# Patient Record
Sex: FEMALE | Race: BLACK OR AFRICAN AMERICAN | HISPANIC OR LATINO | Employment: PART TIME | URBAN - METROPOLITAN AREA
[De-identification: names, ages, dates, MRNs, and addresses within clinical notes are randomized per-mention and may not be internally consistent; named-entity substitution may affect disease eponyms.]

---

## 2017-03-05 ENCOUNTER — HOSPITAL ENCOUNTER (OUTPATIENT)
Facility: MEDICAL CENTER | Age: 37
End: 2017-03-07
Attending: EMERGENCY MEDICINE | Admitting: HOSPITALIST
Payer: COMMERCIAL

## 2017-03-05 ENCOUNTER — APPOINTMENT (OUTPATIENT)
Dept: RADIOLOGY | Facility: MEDICAL CENTER | Age: 37
End: 2017-03-05
Attending: EMERGENCY MEDICINE
Payer: COMMERCIAL

## 2017-03-05 ENCOUNTER — OFFICE VISIT (OUTPATIENT)
Dept: URGENT CARE | Facility: CLINIC | Age: 37
End: 2017-03-05
Payer: COMMERCIAL

## 2017-03-05 ENCOUNTER — RESOLUTE PROFESSIONAL BILLING HOSPITAL PROF FEE (OUTPATIENT)
Dept: HOSPITALIST | Facility: MEDICAL CENTER | Age: 37
End: 2017-03-05
Payer: COMMERCIAL

## 2017-03-05 VITALS
WEIGHT: 260 LBS | OXYGEN SATURATION: 97 % | SYSTOLIC BLOOD PRESSURE: 124 MMHG | HEART RATE: 80 BPM | HEIGHT: 65 IN | DIASTOLIC BLOOD PRESSURE: 76 MMHG | BODY MASS INDEX: 43.32 KG/M2 | TEMPERATURE: 97.8 F | RESPIRATION RATE: 14 BRPM

## 2017-03-05 DIAGNOSIS — R10.13 EPIGASTRIC ABDOMINAL PAIN: Primary | ICD-10-CM

## 2017-03-05 DIAGNOSIS — R79.89 ELEVATED LFTS: ICD-10-CM

## 2017-03-05 DIAGNOSIS — R11.2 NAUSEA AND VOMITING, INTRACTABILITY OF VOMITING NOT SPECIFIED, UNSPECIFIED VOMITING TYPE: ICD-10-CM

## 2017-03-05 DIAGNOSIS — R10.13 EPIGASTRIC PAIN: ICD-10-CM

## 2017-03-05 DIAGNOSIS — K80.43 CHOLEDOCHOLITHIASIS WITH ACUTE CHOLECYSTITIS WITH OBSTRUCTION: ICD-10-CM

## 2017-03-05 PROBLEM — R10.9 ABDOMINAL PAIN: Status: ACTIVE | Noted: 2017-03-05

## 2017-03-05 LAB
ALBUMIN SERPL BCP-MCNC: 4.3 G/DL (ref 3.2–4.9)
ALBUMIN/GLOB SERPL: 1 G/DL
ALP SERPL-CCNC: 323 U/L (ref 30–99)
ALT SERPL-CCNC: 528 U/L (ref 2–50)
ANION GAP SERPL CALC-SCNC: 10 MMOL/L (ref 0–11.9)
APAP SERPL-MCNC: <10 UG/ML (ref 10–30)
APPEARANCE UR: CLEAR
APPEARANCE UR: CLEAR
APPEARANCE UR: NORMAL
AST SERPL-CCNC: 254 U/L (ref 12–45)
BASOPHILS # BLD AUTO: 1.8 % (ref 0–1.8)
BASOPHILS # BLD: 0.1 K/UL (ref 0–0.12)
BILIRUB SERPL-MCNC: 4.4 MG/DL (ref 0.1–1.5)
BILIRUB UR STRIP-MCNC: NORMAL MG/DL
BUN SERPL-MCNC: 6 MG/DL (ref 8–22)
CALCIUM SERPL-MCNC: 9.2 MG/DL (ref 8.4–10.2)
CHLORIDE SERPL-SCNC: 99 MMOL/L (ref 96–112)
CO2 SERPL-SCNC: 25 MMOL/L (ref 20–33)
COLOR UR AUTO: NORMAL
COLOR UR: ABNORMAL
COLOR UR: YELLOW
CREAT SERPL-MCNC: 0.75 MG/DL (ref 0.5–1.4)
EOSINOPHIL # BLD AUTO: 0.07 K/UL (ref 0–0.51)
EOSINOPHIL NFR BLD: 1.3 % (ref 0–6.9)
ERYTHROCYTE [DISTWIDTH] IN BLOOD BY AUTOMATED COUNT: 39.7 FL (ref 35.9–50)
GFR SERPL CREATININE-BSD FRML MDRD: >60 ML/MIN/1.73 M 2
GLOBULIN SER CALC-MCNC: 4.2 G/DL (ref 1.9–3.5)
GLUCOSE SERPL-MCNC: 100 MG/DL (ref 65–99)
GLUCOSE UR STRIP.AUTO-MCNC: NEGATIVE MG/DL
GLUCOSE UR STRIP.AUTO-MCNC: NEGATIVE MG/DL
GLUCOSE UR STRIP.AUTO-MCNC: NORMAL MG/DL
HCG SERPL QL: NEGATIVE
HCT VFR BLD AUTO: 39.4 % (ref 37–47)
HGB BLD-MCNC: 12.8 G/DL (ref 12–16)
IMM GRANULOCYTES # BLD AUTO: 0.01 K/UL (ref 0–0.11)
IMM GRANULOCYTES NFR BLD AUTO: 0.2 % (ref 0–0.9)
INR PPP: 1.01 (ref 0.87–1.13)
KETONES UR STRIP.AUTO-MCNC: 80 MG/DL
KETONES UR STRIP.AUTO-MCNC: >=160 MG/DL
KETONES UR STRIP.AUTO-MCNC: >=160 MG/DL
LEUKOCYTE ESTERASE UR QL STRIP.AUTO: NEGATIVE
LEUKOCYTE ESTERASE UR QL STRIP.AUTO: NEGATIVE
LEUKOCYTE ESTERASE UR QL STRIP.AUTO: NORMAL
LIPASE SERPL-CCNC: 17 U/L (ref 7–58)
LYMPHOCYTES # BLD AUTO: 1.59 K/UL (ref 1–4.8)
LYMPHOCYTES NFR BLD: 29.3 % (ref 22–41)
MCH RBC QN AUTO: 25 PG (ref 27–33)
MCHC RBC AUTO-ENTMCNC: 32.5 G/DL (ref 33.6–35)
MCV RBC AUTO: 77 FL (ref 81.4–97.8)
MONOCYTES # BLD AUTO: 0.46 K/UL (ref 0–0.85)
MONOCYTES NFR BLD AUTO: 8.5 % (ref 0–13.4)
NEUTROPHILS # BLD AUTO: 3.19 K/UL (ref 2–7.15)
NEUTROPHILS NFR BLD: 58.9 % (ref 44–72)
NITRITE UR QL STRIP.AUTO: NEGATIVE
NITRITE UR QL STRIP.AUTO: NEGATIVE
NITRITE UR QL STRIP.AUTO: NORMAL
NRBC # BLD AUTO: 0 K/UL
NRBC BLD AUTO-RTO: 0 /100 WBC
PH UR STRIP.AUTO: 5.5 [PH]
PH UR STRIP.AUTO: 5.5 [PH]
PH UR STRIP.AUTO: 6 [PH] (ref 5–8)
PLATELET # BLD AUTO: 474 K/UL (ref 164–446)
PMV BLD AUTO: 9.3 FL (ref 9–12.9)
POTASSIUM SERPL-SCNC: 3.4 MMOL/L (ref 3.6–5.5)
PROT SERPL-MCNC: 8.5 G/DL (ref 6–8.2)
PROT UR QL STRIP: 30 MG/DL
PROT UR QL STRIP: ABNORMAL MG/DL
PROT UR QL STRIP: ABNORMAL MG/DL
PROTHROMBIN TIME: 13.1 SEC (ref 12–14.6)
RBC # BLD AUTO: 5.12 M/UL (ref 4.2–5.4)
RBC UR QL AUTO: ABNORMAL
RBC UR QL AUTO: ABNORMAL
RBC UR QL AUTO: NORMAL
SODIUM SERPL-SCNC: 134 MMOL/L (ref 135–145)
SP GR UR STRIP.AUTO: 1.03
SP GR UR STRIP.AUTO: >=1.03
SP GR UR STRIP.AUTO: >=1.03
UROBILINOGEN UR STRIP-MCNC: NORMAL MG/DL
WBC # BLD AUTO: 5.4 K/UL (ref 4.8–10.8)

## 2017-03-05 PROCEDURE — 85025 COMPLETE CBC W/AUTO DIFF WBC: CPT

## 2017-03-05 PROCEDURE — 83690 ASSAY OF LIPASE: CPT

## 2017-03-05 PROCEDURE — 81002 URINALYSIS NONAUTO W/O SCOPE: CPT | Performed by: PHYSICIAN ASSISTANT

## 2017-03-05 PROCEDURE — 83550 IRON BINDING TEST: CPT

## 2017-03-05 PROCEDURE — 80074 ACUTE HEPATITIS PANEL: CPT

## 2017-03-05 PROCEDURE — 83540 ASSAY OF IRON: CPT

## 2017-03-05 PROCEDURE — 86704 HEP B CORE ANTIBODY TOTAL: CPT

## 2017-03-05 PROCEDURE — 84703 CHORIONIC GONADOTROPIN ASSAY: CPT

## 2017-03-05 PROCEDURE — 80307 DRUG TEST PRSMV CHEM ANLYZR: CPT

## 2017-03-05 PROCEDURE — 74176 CT ABD & PELVIS W/O CONTRAST: CPT

## 2017-03-05 PROCEDURE — 76705 ECHO EXAM OF ABDOMEN: CPT

## 2017-03-05 PROCEDURE — G0378 HOSPITAL OBSERVATION PER HR: HCPCS

## 2017-03-05 PROCEDURE — 99285 EMERGENCY DEPT VISIT HI MDM: CPT

## 2017-03-05 PROCEDURE — 99204 OFFICE O/P NEW MOD 45 MIN: CPT | Performed by: PHYSICIAN ASSISTANT

## 2017-03-05 PROCEDURE — 80053 COMPREHEN METABOLIC PANEL: CPT

## 2017-03-05 PROCEDURE — 99220 PR INITIAL OBSERVATION CARE,LEVL III: CPT | Performed by: HOSPITALIST

## 2017-03-05 PROCEDURE — 85610 PROTHROMBIN TIME: CPT

## 2017-03-05 PROCEDURE — 700105 HCHG RX REV CODE 258: Performed by: EMERGENCY MEDICINE

## 2017-03-05 PROCEDURE — 82105 ALPHA-FETOPROTEIN SERUM: CPT

## 2017-03-05 PROCEDURE — 700101 HCHG RX REV CODE 250

## 2017-03-05 PROCEDURE — 700111 HCHG RX REV CODE 636 W/ 250 OVERRIDE (IP)

## 2017-03-05 PROCEDURE — 81002 URINALYSIS NONAUTO W/O SCOPE: CPT | Mod: 91

## 2017-03-05 PROCEDURE — 86706 HEP B SURFACE ANTIBODY: CPT

## 2017-03-05 PROCEDURE — 82728 ASSAY OF FERRITIN: CPT

## 2017-03-05 PROCEDURE — 36415 COLL VENOUS BLD VENIPUNCTURE: CPT

## 2017-03-05 PROCEDURE — 86708 HEPATITIS A ANTIBODY: CPT

## 2017-03-05 RX ORDER — AMOXICILLIN 250 MG
2 CAPSULE ORAL 2 TIMES DAILY
Status: DISCONTINUED | OUTPATIENT
Start: 2017-03-06 | End: 2017-03-07

## 2017-03-05 RX ORDER — ONDANSETRON 4 MG/1
4 TABLET, ORALLY DISINTEGRATING ORAL EVERY 4 HOURS PRN
Status: DISCONTINUED | OUTPATIENT
Start: 2017-03-05 | End: 2017-03-07 | Stop reason: HOSPADM

## 2017-03-05 RX ORDER — LABETALOL HYDROCHLORIDE 5 MG/ML
10 INJECTION, SOLUTION INTRAVENOUS EVERY 4 HOURS PRN
Status: DISCONTINUED | OUTPATIENT
Start: 2017-03-05 | End: 2017-03-07 | Stop reason: HOSPADM

## 2017-03-05 RX ORDER — SODIUM CHLORIDE 9 MG/ML
INJECTION, SOLUTION INTRAVENOUS ONCE
Status: DISCONTINUED | OUTPATIENT
Start: 2017-03-05 | End: 2017-03-06

## 2017-03-05 RX ORDER — SODIUM CHLORIDE AND POTASSIUM CHLORIDE 150; 900 MG/100ML; MG/100ML
INJECTION, SOLUTION INTRAVENOUS CONTINUOUS
Status: DISCONTINUED | OUTPATIENT
Start: 2017-03-05 | End: 2017-03-07 | Stop reason: HOSPADM

## 2017-03-05 RX ORDER — OMEPRAZOLE 20 MG/1
20 CAPSULE, DELAYED RELEASE ORAL DAILY
Status: DISCONTINUED | OUTPATIENT
Start: 2017-03-06 | End: 2017-03-07 | Stop reason: HOSPADM

## 2017-03-05 RX ORDER — PROMETHAZINE HYDROCHLORIDE 25 MG/1
12.5-25 TABLET ORAL EVERY 4 HOURS PRN
Status: DISCONTINUED | OUTPATIENT
Start: 2017-03-05 | End: 2017-03-07 | Stop reason: HOSPADM

## 2017-03-05 RX ORDER — BISACODYL 10 MG
10 SUPPOSITORY, RECTAL RECTAL
Status: DISCONTINUED | OUTPATIENT
Start: 2017-03-05 | End: 2017-03-07

## 2017-03-05 RX ORDER — CEFTRIAXONE 1 G/1
1 INJECTION, POWDER, FOR SOLUTION INTRAMUSCULAR; INTRAVENOUS ONCE
Status: COMPLETED | OUTPATIENT
Start: 2017-03-05 | End: 2017-03-06

## 2017-03-05 RX ORDER — ONDANSETRON 2 MG/ML
4 INJECTION INTRAMUSCULAR; INTRAVENOUS EVERY 4 HOURS PRN
Status: DISCONTINUED | OUTPATIENT
Start: 2017-03-05 | End: 2017-03-07 | Stop reason: HOSPADM

## 2017-03-05 RX ORDER — LEVOTHYROXINE SODIUM 0.1 MG/1
200 TABLET ORAL
Status: DISCONTINUED | OUTPATIENT
Start: 2017-03-06 | End: 2017-03-07 | Stop reason: HOSPADM

## 2017-03-05 RX ORDER — PROMETHAZINE HYDROCHLORIDE 25 MG/1
12.5-25 SUPPOSITORY RECTAL EVERY 4 HOURS PRN
Status: DISCONTINUED | OUTPATIENT
Start: 2017-03-05 | End: 2017-03-07 | Stop reason: HOSPADM

## 2017-03-05 RX ORDER — SODIUM CHLORIDE 9 MG/ML
1000 INJECTION, SOLUTION INTRAVENOUS ONCE
Status: COMPLETED | OUTPATIENT
Start: 2017-03-05 | End: 2017-03-05

## 2017-03-05 RX ORDER — ZOLPIDEM TARTRATE 5 MG/1
5 TABLET ORAL
Status: DISCONTINUED | OUTPATIENT
Start: 2017-03-05 | End: 2017-03-07 | Stop reason: HOSPADM

## 2017-03-05 RX ORDER — ACETAMINOPHEN 325 MG/1
650 TABLET ORAL EVERY 6 HOURS PRN
Status: DISCONTINUED | OUTPATIENT
Start: 2017-03-05 | End: 2017-03-07 | Stop reason: HOSPADM

## 2017-03-05 RX ORDER — POLYETHYLENE GLYCOL 3350 17 G/17G
1 POWDER, FOR SOLUTION ORAL
Status: DISCONTINUED | OUTPATIENT
Start: 2017-03-05 | End: 2017-03-07

## 2017-03-05 RX ADMIN — SODIUM CHLORIDE 1000 ML: 9 INJECTION, SOLUTION INTRAVENOUS at 20:39

## 2017-03-05 ASSESSMENT — PAIN SCALES - GENERAL: PAINLEVEL_OUTOF10: 5

## 2017-03-05 NOTE — IP AVS SNAPSHOT
3/7/2017          Maame Enrique  [address] [address]  [place] [address]    Dear Maame:    [facility] wants to ensure your discharge home is safe and you or your loved ones have had all your questions answered regarding your care after you leave the hospital.    You may receive a telephone call within two days of your discharge.  This call is to make certain you understand your discharge instructions as well as ensure we provided you with the best care possible during your stay with us.     The call will only last approximately 3-5 minutes and will be done by a nurse.    Once again, we want to ensure your discharge home is safe and that you have a clear understanding of any next steps in your care.  If you have any questions or concerns, please do not hesitate to contact us, we are here for you.  Thank you for choosing [facility] for your healthcare needs.    Sincerely,    Arnulfo Hair    [facility]

## 2017-03-05 NOTE — IP AVS SNAPSHOT
[employer] Access Code: [email or web address]  Expires: 3/25/2017 12:18 PM    [employer]  A secure, online tool to manage your health information     [employer]’s [employer]® is a secure, online tool that connects you to your personalized health information from the privacy of your home -- day or night - making it very easy for you to manage your healthcare. Once the activation process is completed, you can even access your medical information using the [employer] mirella, which is available for free in the Apple Mirella store or Google Play store.     [employer] provides the following levels of access (as shown below):   My Chart Features   [facility] Primary Care Doctor [facility]  Specialists [facility]  Urgent  Care Non-[facility]  Primary Care  Doctor   Email your healthcare team securely and privately 24/7 X X X X   Manage appointments: schedule your next appointment; view details of past/upcoming appointments X      Request prescription refills. X      View recent personal medical records, including lab and immunizations X X X X   View health record, including health history, allergies, medications X X X X   Read reports about your outpatient visits, procedures, consult and ER notes X X X X   See your discharge summary, which is a recap of your hospital and/or ER visit that includes your diagnosis, lab results, and care plan. X X       How to register for [employer]:  1. Go to  [email or web address].  2. Click on the Sign Up Now box, which takes you to the New Member Sign Up page. You will need to provide the following information:  a. Enter your [employer] Access Code exactly as it appears at the top of this page. (You will not need to use this code after you’ve completed the sign-up process. If you do not sign up before the expiration date, you must request a new code.)   b. Enter your date of birth.   c. Enter your home email address.   d. Click Submit, and follow the next screen’s instructions.  3. Create a [employer] ID. This will be your [employer]  login ID and cannot be changed, so think of one that is secure and easy to remember.  4. Create a ShopLogic password. You can change your password at any time.  5. Enter your Password Reset Question and Answer. This can be used at a later time if you forget your password.   6. Enter your e-mail address. This allows you to receive e-mail notifications when new information is available in ShopLogic.  7. Click Sign Up. You can now view your health information.    For assistance activating your [employer] account, call [phone]

## 2017-03-05 NOTE — IP AVS SNAPSHOT
" Home Care Instructions                                                                                                                  Name:Maame Enrique  Medical Record Number:[id number]  CSN: [id number]    YOB: 1980   Age: 36 y.o.  Sex: female  HT:1.676 m (5' 6\") WT: 120.3 kg (265 lb 3.4 oz)          Admit Date: 3/5/2017     Discharge Date:   Today's Date: 3/7/2017  Attending Doctor:  Royce Lombardo M.D.                  Allergies:  Review of patient's allergies indicates no known allergies.            Discharge Instructions       Discharge Instructions    Discharged to home by car with relative. Discharged via wheelchair, hospital escort: Yes.  Special equipment needed: Not Applicable    Be sure to schedule a follow-up appointment with your primary care doctor or any specialists as instructed.     Discharge Plan:   Diet Plan: Discussed  Activity Level: Discussed  Confirmed Follow up Appointment: Patient to Call and Schedule Appointment  Confirmed Symptoms Management: Discussed  Medication Reconciliation Updated: Yes  Influenza Vaccine Indication: Not indicated: Previously immunized this influenza season and > 8 years of age    I understand that a diet low in cholesterol, fat, and sodium is recommended for good health. Unless I have been given specific instructions below for another diet, I accept this instruction as my diet prescription.   Other diet: Regular as tolerated    Special Instructions:     · Is patient discharged on Warfarin / Coumadin?   No     · Is patient Post Blood Transfusion?  No    Depression / Suicide Risk    As you are discharged from this [facility] facility, it is important to learn how to keep safe from harming yourself.    Recognize the warning signs:  · Abrupt changes in personality, positive or negative- including increase in energy   · Giving away possessions  · Change in eating patterns- significant weight changes-  positive or negative  · Change in sleeping " patterns- unable to sleep or sleeping all the time   · Unwillingness or inability to communicate  · Depression  · Unusual sadness, discouragement and loneliness  · Talk of wanting to die  · Neglect of personal appearance   · Rebelliousness- reckless behavior  · Withdrawal from people/activities they love  · Confusion- inability to concentrate     If you or a loved one observes any of these behaviors or has concerns about self-harm, here's what you can do:  · Talk about it- your feelings and reasons for harming yourself  · Remove any means that you might use to hurt yourself (examples: pills, rope, extension cords, firearm)  · Get professional help from the community (Mental Health, Substance Abuse, psychological counseling)  · Do not be alone:Call your Safe Contact- someone whom you trust who will be there for you.  · Call your local CRISIS HOTLINE 137-5199 or 536-186-5143  · Call your local Children's Mobile Crisis Response Team [place] (160) 095-3335 or www.SpiritShop.com  · Call the toll free National Suicide Prevention Hotlines   · National Suicide Prevention Lifeline 831-545-IHME (4878)  · National Hope Line Network 800-SUICIDE (051-4905)        Follow-up Information     1. Follow up with Joey Greenfield D.O.. Schedule an appointment as soon as possible for a visit in 2 weeks.    Specialty:  Family Medicine    Why:  Hospital follow-up appointment with PCP    Contact information    [address] [address] [phone]          2. Follow up with Brianna Norris M.D.. Schedule an appointment as soon as possible for a visit in 10 days.    Specialties:  Surgery, Radiology    Contact information    [address]  [address]  [facility] [address] [phone]           Discharge Medication Instructions:    Below are the medications your physician expects you to take upon discharge:    Review all your home medications and newly ordered medications with your doctor and/or pharmacist. Follow  medication instructions as directed by your doctor and/or pharmacist.    Please keep your medication list with you and share with your physician.               Medication List      START taking these medications        Instructions    acetaminophen 325 MG Tabs   Last time this was given:  650 mg on 3/7/2017  6:58 AM   Commonly known as:  TYLENOL    Take 2 Tabs by mouth every four hours as needed (Mild Pain; (Pain scale 1-3); Temp greater than 100.5 F).   Dose:  650 mg         CONTINUE taking these medications        Instructions    FLONASE NA    Spray  in nose.       LEVOTHYROXINE SODIUM    200 mcg by Does not apply route every day.   Dose:  200 mcg       NORETHINDRONE PO    Take 0.35 mg by mouth every day.   Dose:  0.35 mg       PRENATAL COMPLETE PO    Take  by mouth.               Instructions           Diet / Nutrition:    Follow any diet instructions given to you by your doctor or the dietician, including how much salt (sodium) you are allowed each day.    If you are overweight, talk to your doctor about a weight reduction plan.    Activity:    Remain physically active following your doctor's instructions about exercise and activity.    Rest often.     Any time you become even a little tired or short of breath, SIT DOWN and rest.    Worsening Symptoms:    Report any of the following signs and symptoms to the doctor's office immediately:    *Pain of jaw, arm, or neck  *Chest pain not relieved by medication                               *Dizziness or loss of consciousness  *Difficulty breathing even when at rest   *More tired than usual                                       *Bleeding drainage or swelling of surgical site  *Swelling of feet, ankles, legs or stomach                 *Fever (>100ºF)  *Pink or blood tinged sputum  *Weight gain (3lbs/day or 5lbs /week)           *Shock from internal defibrillator (if applicable)  *Palpitations or irregular heartbeats                *Cool and/or numb  extremities    Stroke Awareness    Common Risk Factors for Stroke include:    Age  Atrial Fibrillation  Carotid Artery Stenosis  Diabetes Mellitus  Excessive alcohol consumption  High blood pressure  Overweight   Physical inactivity  Smoking    Warning signs and symptoms of a stroke include:    *Sudden numbness or weakness of the face, arm or leg (especially on one side of the body).  *Sudden confusion, trouble speaking or understanding.  *Sudden trouble seeing in one or both eyes.  *Sudden trouble walking, dizziness, loss of balance or coordination.Sudden severe headache with no known cause.    It is very important to get treatment quickly when a stroke occurs. If you experience any of the above warning signs, call 911 immediately.                   Disclaimer         Quit Smoking / Tobacco Use:    I understand the use of any tobacco products increases my chance of suffering from future heart disease or stroke and could cause other illnesses which may shorten my life. Quitting the use of tobacco products is the single most important thing I can do to improve my health. For further information on smoking / tobacco cessation call a Toll Free Quit Line at 1-748.779.5978 (*National Cancer [place]) or 1-356.996.7466 (American Lung Association) or you can access the web based program at www.lungusa.org.    [place] Tobacco Users Help Line:  (178) 714-6145       Toll Free: 1-331.687.8752  Quit Tobacco Program [facility] (168)973-8413    Crisis Hotline:    [place] Crisis Hotline:  1-866-VJNYNTJ or 1-872.318.5952    [place] Crisis Hotline:    1-311.425.6999 or 296-019-7305    Discharge Survey:   Thank you for choosing [facility]. We hope we did everything we could to make your hospital stay a pleasant one. You may be receiving a phone survey and we would appreciate your time and participation in answering the questions. Your input is very valuable to us in our efforts to improve our service to our  patients and their families.        My signature on this form indicates that:    1. I have reviewed and understand the above information.  2. My questions regarding this information have been answered to my satisfaction.  3. I have formulated a plan with my discharge nurse to obtain my prescribed medications for home.                  Disclaimer         __________________________________                     __________       ________                       Patient Signature                                                 Date                    Time

## 2017-03-05 NOTE — MR AVS SNAPSHOT
"Maame Enrique   3/5/2017 4:00 PM   Office Visit   MRN: [id number]    Department:  [facility]   Dept Phone:  [phone]    Description:  Female : 1980   Provider:  Jailyn Murray PA-C           Reason for Visit     Abdominal Pain abdominal pain , vomiting x 4 days .      Allergies as of 3/5/2017     No Known Allergies      Vital Signs     Blood Pressure Pulse Temperature Respirations Height Weight    124/76 mmHg 80 36.6 °C (97.8 °F) 14 1.651 m (5' 5\") 117.935 kg (260 lb)    Body Mass Index Oxygen Saturation Last Menstrual Period Breastfeeding? Smoking Status       43.27 kg/m2 97% 2017 Yes Former Smoker       Basic Information     Date Of Birth Sex Race Ethnicity Preferred Language    1980 Female Black or   Origin (Guinean,Austrian,Turkmen,[place], etc) English      Health Maintenance        Date Due Completion Dates    PAP SMEAR 1999 ---    IMM INFLUENZA (1) 2016 10/1/2015    IMM DTaP/Tdap/Td Vaccine (2 - Td) 10/1/2025 10/1/2015            Current Immunizations     Influenza Vaccine Adult HD 10/1/2015    Tdap Vaccine 10/1/2015      Below and/or attached are the medications your provider expects you to take. Review all of your home medications and newly ordered medications with your provider and/or pharmacist. Follow medication instructions as directed by your provider and/or pharmacist. Please keep your medication list with you and share with your provider. Update the information when medications are discontinued, doses are changed, or new medications (including over-the-counter products) are added; and carry medication information at all times in the event of emergency situations     Allergies:  No Known Allergies          Medications  Valid as of: 2017 -  5:27 PM    Generic Name Brand Name Tablet Size Instructions for use    Acetaminophen (Tab) TYLENOL 325 MG Take 1 Tab by mouth every four hours as needed for Mild Pain ((Pain " Scale 1-3)).        Acetaminophen (Tab) Acetaminophen 650 MG Take 650 mg by mouth every four hours as needed for Fever (over 100.4 F).        Docusate Sodium (Cap)  MG Take 100 mg by mouth 2 times a day as needed for Constipation.        Fluticasone Propionate   Spray  in nose.        Ibuprofen (Tab) MOTRIN 600 MG Take 1 Tab by mouth every 6 hours as needed (Cramping).        Levothyroxine Sodium   by Does not apply route.        Prenatal Vit-Fe Fumarate-FA   Take  by mouth.        .                 Medicines prescribed today were sent to:     [id number] - [place], [place] - [address] [facility]    [address] [facility] [address] [place] [address]    Phone: [phone] Fax: [phone]    Open 24 Hours?: No      Medication refill instructions:       If your prescription bottle indicates you have medication refills left, it is not necessary to call your provider’s office. Please contact your pharmacy and they will refill your medication.    If your prescription bottle indicates you do not have any refills left, you may request refills at any time through one of the following ways: The online tweetTV system (except Urgent Care), by calling your provider’s office, or by asking your pharmacy to contact your provider’s office with a refill request. Medication refills are processed only during regular business hours and may not be available until the next business day. Your provider may request additional information or to have a follow-up visit with you prior to refilling your medication.   *Please Note: Medication refills are assigned a new Rx number when refilled electronically. Your pharmacy may indicate that no refills were authorized even though a new prescription for the same medication is available at the pharmacy. Please request the medicine by name with the pharmacy before contacting your provider for a refill.           tweetTV Access Code: [email or web address]  Expires: 3/25/2017 12:18 PM    [employer]  A secure, online  tool to manage your health information     [employer]’s Tower59® is a secure, online tool that connects you to your personalized health information from the privacy of your home -- day or night - making it very easy for you to manage your healthcare. Once the activation process is completed, you can even access your medical information using the Tower59 mirella, which is available for free in the Apple Mirella store or Google Play store.     Tower59 provides the following levels of access (as shown below):   My Chart Features   [facility] Primary Care Doctor [facility] [place] [facility]  Specialists [facility] [place] [facility]  Urgent  Care Non-[facility]  Primary Care  Doctor   Email your healthcare team securely and privately 24/7 X X X    Manage appointments: schedule your next appointment; view details of past/upcoming appointments X      Request prescription refills. X      View recent personal medical records, including lab and immunizations X X X X   View health record, including health history, allergies, medications X X X X   Read reports about your outpatient visits, procedures, consult and ER notes X X X X   See your discharge summary, which is a recap of your hospital and/or ER visit that includes your diagnosis, lab results, and care plan. X X       How to register for Tower59:  1. Go to  [email or web address].  2. Click on the Sign Up Now box, which takes you to the New Member Sign Up page. You will need to provide the following information:  a. Enter your Tower59 Access Code exactly as it appears at the top of this page. (You will not need to use this code after you’ve completed the sign-up process. If you do not sign up before the expiration date, you must request a new code.)   b. Enter your date of birth.   c. Enter your home email address.   d. Click Submit, and follow the next screen’s instructions.  3. Create a Tower59 ID. This will be your Tower59 login ID and cannot be changed, so think of one that is secure and easy to remember.  4. Create a  Ohlalapps password. You can change your password at any time.  5. Enter your Password Reset Question and Answer. This can be used at a later time if you forget your password.   6. Enter your e-mail address. This allows you to receive e-mail notifications when new information is available in Ohlalapps.  7. Click Sign Up. You can now view your health information.    For assistance activating your Oh[employer]alapps account, call [phone]

## 2017-03-05 NOTE — IP AVS SNAPSHOT
" <p align=\"LEFT\"><IMG SRC=\"//EMRWB/blob$/[email or web address]\" alt=\"Image\" WIDTH=\"50%\" HEIGHT=\"200\" BORDER=\"\"></p>                   Name:Mamae Enrique  Medical Record Number:[id number]  CSN: [id number]    YOB: 1980   Age: 36 y.o.  Sex: female  HT:1.676 m (5' 6\") WT: 120.3 kg (265 lb 3.4 oz)          Admit Date: 3/5/2017     Discharge Date:   Today's Date: 3/7/2017  Attending Doctor:  Royce Lombardo M.D.                  Allergies:  Review of patient's allergies indicates no known allergies.          Follow-up Information     1. Follow up with Joey Greenfield D.O.. Schedule an appointment as soon as possible for a visit in 2 weeks.    Specialty:  Family Medicine    Why:  Hospital follow-up appointment with PCP    Contact information    [address] [id number] [phone]          2. Follow up with Brianna Norris M.D.. Schedule an appointment as soon as possible for a visit in 10 days.    Specialties:  Surgery, Radiology    Contact information    [address] [facility]  [address]  [facility] [address] [phone]           Medication List      Take these Medications        Instructions    acetaminophen 325 MG Tabs   Commonly known as:  TYLENOL    Take 2 Tabs by mouth every four hours as needed (Mild Pain; (Pain scale 1-3); Temp greater than 100.5 F).   Dose:  650 mg       FLONASE NA    Spray  in nose.       LEVOTHYROXINE SODIUM    200 mcg by Does not apply route every day.   Dose:  200 mcg       NORETHINDRONE PO    Take 0.35 mg by mouth every day.   Dose:  0.35 mg       PRENATAL COMPLETE PO    Take  by mouth.         "

## 2017-03-06 ENCOUNTER — APPOINTMENT (OUTPATIENT)
Dept: RADIOLOGY | Facility: MEDICAL CENTER | Age: 37
End: 2017-03-06
Attending: INTERNAL MEDICINE
Payer: COMMERCIAL

## 2017-03-06 PROBLEM — K80.19 CHOLELITHIASIS AND CHOLECYSTITIS WITH OBSTRUCTION: Status: ACTIVE | Noted: 2017-03-06

## 2017-03-06 PROBLEM — E03.9 HYPOTHYROID: Chronic | Status: ACTIVE | Noted: 2017-03-06

## 2017-03-06 LAB
AFP-TM SERPL-MCNC: 2 NG/ML (ref 0–9)
ANION GAP SERPL CALC-SCNC: 8 MMOL/L (ref 0–11.9)
BUN SERPL-MCNC: <5 MG/DL (ref 8–22)
CALCIUM SERPL-MCNC: 8.6 MG/DL (ref 8.4–10.2)
CHLORIDE SERPL-SCNC: 103 MMOL/L (ref 96–112)
CO2 SERPL-SCNC: 25 MMOL/L (ref 20–33)
CREAT SERPL-MCNC: 0.8 MG/DL (ref 0.5–1.4)
ERYTHROCYTE [DISTWIDTH] IN BLOOD BY AUTOMATED COUNT: 42 FL (ref 35.9–50)
FERRITIN SERPL-MCNC: 38.2 NG/ML (ref 10–291)
GFR SERPL CREATININE-BSD FRML MDRD: >60 ML/MIN/1.73 M 2
GLUCOSE SERPL-MCNC: 98 MG/DL (ref 65–99)
HAV IGM SERPL QL IA: NEGATIVE
HBV CORE AB SERPL QL IA: NEGATIVE
HBV CORE IGM SER QL: NEGATIVE
HBV SURFACE AB SERPL IA-ACNC: <3.1 MIU/ML (ref 0–10)
HBV SURFACE AG SER QL: NEGATIVE
HCT VFR BLD AUTO: 36.2 % (ref 37–47)
HCV AB SER QL: NEGATIVE
HGB BLD-MCNC: 11.5 G/DL (ref 12–16)
IRON SATN MFR SERPL: 11 % (ref 15–55)
IRON SERPL-MCNC: 55 UG/DL (ref 40–170)
MCH RBC QN AUTO: 25.3 PG (ref 27–33)
MCHC RBC AUTO-ENTMCNC: 31.8 G/DL (ref 33.6–35)
MCV RBC AUTO: 79.7 FL (ref 81.4–97.8)
PATHOLOGY CONSULT NOTE: NORMAL
PLATELET # BLD AUTO: 383 K/UL (ref 164–446)
PMV BLD AUTO: 8.8 FL (ref 9–12.9)
POTASSIUM SERPL-SCNC: 3.4 MMOL/L (ref 3.6–5.5)
RBC # BLD AUTO: 4.54 M/UL (ref 4.2–5.4)
SODIUM SERPL-SCNC: 136 MMOL/L (ref 135–145)
TIBC SERPL-MCNC: 486 UG/DL (ref 250–450)
WBC # BLD AUTO: 3.3 K/UL (ref 4.8–10.8)

## 2017-03-06 PROCEDURE — 700101 HCHG RX REV CODE 250: Performed by: EMERGENCY MEDICINE

## 2017-03-06 PROCEDURE — 160028 HCHG SURGERY MINUTES - 1ST 30 MINS LEVEL 3: Performed by: SURGERY

## 2017-03-06 PROCEDURE — G0378 HOSPITAL OBSERVATION PER HR: HCPCS

## 2017-03-06 PROCEDURE — 94760 N-INVAS EAR/PLS OXIMETRY 1: CPT

## 2017-03-06 PROCEDURE — 500697 HCHG HEMOCLIP, LARGE (ORANGE): Performed by: SURGERY

## 2017-03-06 PROCEDURE — 500066 HCHG BITE BLOCK, ECT: Performed by: INTERNAL MEDICINE

## 2017-03-06 PROCEDURE — 501583 HCHG TROCAR, THRD CAN&SEAL 5X100: Performed by: SURGERY

## 2017-03-06 PROCEDURE — 700111 HCHG RX REV CODE 636 W/ 250 OVERRIDE (IP)

## 2017-03-06 PROCEDURE — 700101 HCHG RX REV CODE 250: Performed by: HOSPITALIST

## 2017-03-06 PROCEDURE — 501582 HCHG TROCAR, THRD BLADED: Performed by: SURGERY

## 2017-03-06 PROCEDURE — 500800 HCHG LAPAROSCOPIC J/L HOOK: Performed by: SURGERY

## 2017-03-06 PROCEDURE — 700101 HCHG RX REV CODE 250

## 2017-03-06 PROCEDURE — 88304 TISSUE EXAM BY PATHOLOGIST: CPT

## 2017-03-06 PROCEDURE — 110371 HCHG SHELL REV 272: Performed by: INTERNAL MEDICINE

## 2017-03-06 PROCEDURE — 502571 HCHG PACK, LAP CHOLE: Performed by: SURGERY

## 2017-03-06 PROCEDURE — 160035 HCHG PACU - 1ST 60 MINS PHASE I: Performed by: SURGERY

## 2017-03-06 PROCEDURE — 160039 HCHG SURGERY MINUTES - EA ADDL 1 MIN LEVEL 3: Performed by: SURGERY

## 2017-03-06 PROCEDURE — 700105 HCHG RX REV CODE 258

## 2017-03-06 PROCEDURE — 160036 HCHG PACU - EA ADDL 30 MINS PHASE I: Performed by: SURGERY

## 2017-03-06 PROCEDURE — 500854 HCHG NEEDLE, INSUFFLATION FOR STEP: Performed by: SURGERY

## 2017-03-06 PROCEDURE — 501838 HCHG SUTURE GENERAL: Performed by: SURGERY

## 2017-03-06 PROCEDURE — 160002 HCHG RECOVERY MINUTES (STAT): Performed by: INTERNAL MEDICINE

## 2017-03-06 PROCEDURE — A9270 NON-COVERED ITEM OR SERVICE: HCPCS

## 2017-03-06 PROCEDURE — 160048 HCHG OR STATISTICAL LEVEL 1-5: Performed by: SURGERY

## 2017-03-06 PROCEDURE — 99285 EMERGENCY DEPT VISIT HI MDM: CPT

## 2017-03-06 PROCEDURE — 160048 HCHG OR STATISTICAL LEVEL 1-5: Performed by: INTERNAL MEDICINE

## 2017-03-06 PROCEDURE — A9270 NON-COVERED ITEM OR SERVICE: HCPCS | Performed by: HOSPITALIST

## 2017-03-06 PROCEDURE — 74328 X-RAY BILE DUCT ENDOSCOPY: CPT

## 2017-03-06 PROCEDURE — 110371 HCHG SHELL REV 272: Performed by: SURGERY

## 2017-03-06 PROCEDURE — A6402 STERILE GAUZE <= 16 SQ IN: HCPCS | Performed by: SURGERY

## 2017-03-06 PROCEDURE — 160208 HCHG ENDO MINUTES - EA ADDL 1 MIN LEVEL 4: Performed by: INTERNAL MEDICINE

## 2017-03-06 PROCEDURE — 500868 HCHG NEEDLE, SURGI(VARES): Performed by: SURGERY

## 2017-03-06 PROCEDURE — 501399 HCHG SPECIMAN BAG, ENDO CATC: Performed by: SURGERY

## 2017-03-06 PROCEDURE — 36415 COLL VENOUS BLD VENIPUNCTURE: CPT

## 2017-03-06 PROCEDURE — 160009 HCHG ANES TIME/MIN: Performed by: SURGERY

## 2017-03-06 PROCEDURE — 160203 HCHG ENDO MINUTES - 1ST 30 MINS LEVEL 4: Performed by: INTERNAL MEDICINE

## 2017-03-06 PROCEDURE — 160009 HCHG ANES TIME/MIN: Performed by: INTERNAL MEDICINE

## 2017-03-06 PROCEDURE — A4606 OXYGEN PROBE USED W OXIMETER: HCPCS | Performed by: SURGERY

## 2017-03-06 PROCEDURE — 501572 HCHG TROCAR, SHIELD OBTU 5X100: Performed by: SURGERY

## 2017-03-06 PROCEDURE — 700111 HCHG RX REV CODE 636 W/ 250 OVERRIDE (IP): Performed by: HOSPITALIST

## 2017-03-06 PROCEDURE — 700102 HCHG RX REV CODE 250 W/ 637 OVERRIDE(OP)

## 2017-03-06 PROCEDURE — 110382 HCHG SHELL REV 271: Performed by: SURGERY

## 2017-03-06 PROCEDURE — 85027 COMPLETE CBC AUTOMATED: CPT

## 2017-03-06 PROCEDURE — 700102 HCHG RX REV CODE 250 W/ 637 OVERRIDE(OP): Performed by: HOSPITALIST

## 2017-03-06 PROCEDURE — 700111 HCHG RX REV CODE 636 W/ 250 OVERRIDE (IP): Performed by: EMERGENCY MEDICINE

## 2017-03-06 PROCEDURE — 160035 HCHG PACU - 1ST 60 MINS PHASE I: Performed by: INTERNAL MEDICINE

## 2017-03-06 PROCEDURE — 160002 HCHG RECOVERY MINUTES (STAT): Performed by: SURGERY

## 2017-03-06 PROCEDURE — 80048 BASIC METABOLIC PNL TOTAL CA: CPT

## 2017-03-06 PROCEDURE — 160036 HCHG PACU - EA ADDL 30 MINS PHASE I: Performed by: INTERNAL MEDICINE

## 2017-03-06 RX ORDER — SODIUM CHLORIDE, SODIUM LACTATE, POTASSIUM CHLORIDE, CALCIUM CHLORIDE 600; 310; 30; 20 MG/100ML; MG/100ML; MG/100ML; MG/100ML
1000 INJECTION, SOLUTION INTRAVENOUS
Status: DISCONTINUED | OUTPATIENT
Start: 2017-03-06 | End: 2017-03-07

## 2017-03-06 RX ORDER — SCOLOPAMINE TRANSDERMAL SYSTEM 1 MG/1
1 PATCH, EXTENDED RELEASE TRANSDERMAL
Status: DISCONTINUED | OUTPATIENT
Start: 2017-03-06 | End: 2017-03-07 | Stop reason: HOSPADM

## 2017-03-06 RX ORDER — SCOLOPAMINE TRANSDERMAL SYSTEM 1 MG/1
PATCH, EXTENDED RELEASE TRANSDERMAL
Status: COMPLETED
Start: 2017-03-06 | End: 2017-03-06

## 2017-03-06 RX ORDER — BUPIVACAINE HYDROCHLORIDE AND EPINEPHRINE 5; 5 MG/ML; UG/ML
INJECTION, SOLUTION EPIDURAL; INTRACAUDAL; PERINEURAL
Status: DISCONTINUED | OUTPATIENT
Start: 2017-03-06 | End: 2017-03-06 | Stop reason: HOSPADM

## 2017-03-06 RX ORDER — SODIUM CHLORIDE, SODIUM LACTATE, POTASSIUM CHLORIDE, CALCIUM CHLORIDE 600; 310; 30; 20 MG/100ML; MG/100ML; MG/100ML; MG/100ML
1000 INJECTION, SOLUTION INTRAVENOUS CONTINUOUS
Status: DISCONTINUED | OUTPATIENT
Start: 2017-03-06 | End: 2017-03-07 | Stop reason: HOSPADM

## 2017-03-06 RX ORDER — POTASSIUM CHLORIDE 20 MEQ/1
40 TABLET, EXTENDED RELEASE ORAL ONCE
Status: DISPENSED | OUTPATIENT
Start: 2017-03-06 | End: 2017-03-07

## 2017-03-06 RX ADMIN — FENTANYL CITRATE 25 MCG: 50 INJECTION, SOLUTION INTRAMUSCULAR; INTRAVENOUS at 18:05

## 2017-03-06 RX ADMIN — SENNOSIDES AND DOCUSATE SODIUM 2 TABLET: 8.6; 5 TABLET ORAL at 21:02

## 2017-03-06 RX ADMIN — SODIUM CHLORIDE AND POTASSIUM CHLORIDE: 9; 1.49 INJECTION, SOLUTION INTRAVENOUS at 01:04

## 2017-03-06 RX ADMIN — SODIUM CHLORIDE, SODIUM LACTATE, POTASSIUM CHLORIDE, CALCIUM CHLORIDE 1000 ML: 600; 310; 30; 20 INJECTION, SOLUTION INTRAVENOUS at 12:30

## 2017-03-06 RX ADMIN — FENTANYL CITRATE 25 MCG: 50 INJECTION, SOLUTION INTRAMUSCULAR; INTRAVENOUS at 18:00

## 2017-03-06 RX ADMIN — SCOPALAMINE 1 PATCH: 1 PATCH, EXTENDED RELEASE TRANSDERMAL at 11:30

## 2017-03-06 RX ADMIN — METRONIDAZOLE 500 MG: 500 INJECTION, SOLUTION INTRAVENOUS at 01:36

## 2017-03-06 RX ADMIN — SODIUM CHLORIDE AND POTASSIUM CHLORIDE 100 ML: 9; 1.49 INJECTION, SOLUTION INTRAVENOUS at 13:31

## 2017-03-06 RX ADMIN — CEFTRIAXONE SODIUM 1 G: 1 INJECTION, POWDER, FOR SOLUTION INTRAMUSCULAR; INTRAVENOUS at 00:59

## 2017-03-06 RX ADMIN — HYDROCODONE BITARTRATE AND ACETAMINOPHEN 15 ML: 7.5; 325 SOLUTION ORAL at 18:00

## 2017-03-06 ASSESSMENT — ENCOUNTER SYMPTOMS
SHORTNESS OF BREATH: 0
FOCAL WEAKNESS: 0
CHILLS: 0
FEVER: 0
DEPRESSION: 0
DIZZINESS: 0
VOMITING: 0
PSYCHIATRIC NEGATIVE: 1
RESPIRATORY NEGATIVE: 1
EYES NEGATIVE: 1
CHILLS: 0
CONSTITUTIONAL NEGATIVE: 1
STRIDOR: 0
WEAKNESS: 0
PALPITATIONS: 0
ABDOMINAL PAIN: 1
MYALGIAS: 0
BLOOD IN STOOL: 0
VOMITING: 1
ABDOMINAL PAIN: 1
COUGH: 0
DIARRHEA: 0
NAUSEA: 1
PALPITATIONS: 0
HEADACHES: 0
CONSTIPATION: 0
CARDIOVASCULAR NEGATIVE: 1
NEUROLOGICAL NEGATIVE: 1
CONSTIPATION: 0
SHORTNESS OF BREATH: 0
FEVER: 0
HEARTBURN: 0
NAUSEA: 1
HEARTBURN: 0
BRUISES/BLEEDS EASILY: 0
MUSCULOSKELETAL NEGATIVE: 1

## 2017-03-06 ASSESSMENT — LIFESTYLE VARIABLES
EVER_SMOKED: YES
ON A TYPICAL DAY WHEN YOU DRINK ALCOHOL HOW MANY DRINKS DO YOU HAVE: 1
ALCOHOL_USE: YES
TOTAL SCORE: 0
AVERAGE NUMBER OF DAYS PER WEEK YOU HAVE A DRINK CONTAINING ALCOHOL: 2
HAVE PEOPLE ANNOYED YOU BY CRITICIZING YOUR DRINKING: NO
HOW MANY TIMES IN THE PAST YEAR HAVE YOU HAD 5 OR MORE DRINKS IN A DAY: 0
TOTAL SCORE: 0
HAVE YOU EVER FELT YOU SHOULD CUT DOWN ON YOUR DRINKING: NO
EVER FELT BAD OR GUILTY ABOUT YOUR DRINKING: NO
EVER HAD A DRINK FIRST THING IN THE MORNING TO STEADY YOUR NERVES TO GET RID OF A HANGOVER: NO
TOTAL SCORE: 0
CONSUMPTION TOTAL: NEGATIVE

## 2017-03-06 ASSESSMENT — PAIN SCALES - GENERAL
PAINLEVEL_OUTOF10: 3
PAINLEVEL_OUTOF10: 0
PAINLEVEL_OUTOF10: 0
PAINLEVEL_OUTOF10: 3
PAINLEVEL_OUTOF10: 3
PAINLEVEL_OUTOF10: 0
PAINLEVEL_OUTOF10: 3
PAINLEVEL_OUTOF10: 2
PAINLEVEL_OUTOF10: 1
PAINLEVEL_OUTOF10: 4
PAINLEVEL_OUTOF10: 2
PAINLEVEL_OUTOF10: 6
PAINLEVEL_OUTOF10: 1
PAINLEVEL_OUTOF10: 2

## 2017-03-06 ASSESSMENT — CROHNS DISEASE ACTIVITY INDEX (CDAI): CDAI SCORE: 0

## 2017-03-06 NOTE — PROGRESS NOTES
Patient seen and examined prior to going back to [facility] OR for ERCP with anesthesia with appropriated indication of obstructive jaundice, abnormal biliary imaging, suspected choledocholithiasis with obstruction.  Risks, benefits, and alternatives were discussed with patient.  Consenting person was given an opportunity to ask questions and discuss other options.  Risks including but not limited to pancreatitis, contrast reaction, radiation exposure, failed or incomplete ERCP, possible need for temporary stent placement, retained choledocholithiasis, perforation, infection, bleeding, missed lesion(s), cardiac and/or pulmonary event, aspiration, stroke, possible need for surgery and/or interventional radiology, hospitalization possibly prolonged, discomfort, unsuccessful and/or incomplete procedure, ineffective therapy and/or persistent symptoms, possible need for repeat procedures and/or additional testings, damage to adjacent organs and/or vascular structures, medication reaction, disability, death, and other adverse events possibly life-threatening.  Discussion was undertaken with Layman's terms.  Consenting person stated understanding and acceptance of these risks, and wished to proceed.  Consent was given in clear state of mind.

## 2017-03-06 NOTE — H&P
Date of admission 3/5/2017    CHIEF COMPLAINT:  Abdominal pain.    PRIMARY CARE PHYSICIAN:  Dr. Greenfield.    ADMITTED TO:  [facility] hospitalist, Dr. Jernigan.    ON CONSULT:  GI, Dr. Yung.    ADMITTED TO:  [facility]ist, Dr. Jernigan.    DIAGNOSIS:  Acute cholecystitis with common bile duct stone.    HISTORY OF CURRENT ILLNESS:  The patient is a 36-year-old female who has been   having sharp pains since Thursday in the epigastric region radiating into the   right and left upper quadrants, 10/10 in intensity.  It is intermittent, when   it comes on it is 10/10.  It is usually after she eats that it comes on, there   is accompanying nausea, vomiting and headache.  No fevers or chills.  No   chest pain, no shortness of breath.  The patient at this point has had an   ultrasound.  The ultrasound does show at this point, multiple gallstones   including a retained gallstone in the common bile duct and the patient's liver   functions at this point have elevated.  The patient at this point will need a   MRCP and then she will need an ERCP in the morning and the patient after the   ERCP will need surgical consultation and removal of her gallbladder.    PAST MEDICAL HISTORY:  In the patient includes hypothyroidism.    PAST SURGICAL HISTORY:  She had a D and C 2 years ago.    ALLERGIES:  No known allergies.    MEDICATIONS:  At the time of  admission include Flonase 1 spray in the nose   twice daily, Synthroid 200 mcg p.o. daily, norethindrone 1 tablet p.o. daily,   prenatal vitamins 1 tablet p.o. daily.    SOCIAL HISTORY:  She is a former smoker.  She smoked for 20 years, half pack a   day.  She quit in 2012.  She occasionally drinks alcohol, does not use any   recreational drugs.  She has an advanced directive.  She wishes to be DNR code   status.    FAMILY HISTORY:  Mother has diabetes, hypertension.  Father is healthy.    REVIEW OF SYSTEMS:  She complains of nausea, complains of abdominal pain,   complains of vomiting,  complains of 10/10, epigastric, sharp pain radiating to   the left and upper right quadrants, complains of a headache.  Denies fevers   or chills.  Denies at this point, chest pain, shortness of breath.  All other   review of systems were reviewed and are negative.    PHYSICAL EXAMINATION:  VITAL SIGNS:  Temperature 37.2 degrees Celsius, pulse 70, respirations are 16,   blood pressure 113/78, she is 120 kilograms in weight, 167 cm tall.  GENERAL:  She is currently alert, awake, oriented x3, pleasant, cooperative   female appears her stated age.  HEENT:  Head is atraumatic.  Eyes follow in normal range of gaze.  Pupils are   equal, round, reactive bilaterally.  Nose is midline without discharge.  Ears   bilaterally intact without discharge.  Oral cavity, moist mucous membranes.    No apparent focus infection in the oral cavity.  NECK:  Soft and supple.  No JVD, carotid bruit, thyromegaly, or   lymphadenopathy appreciated.  CHEST:  Chest wall moves equally with inspiration and expiration.  No   paradoxical motion.  No reproducible chest wall tenderness.  HEART:  S1, S2.  No murmurs, gallops detected.  LUNGS:  Clear to auscultation.  No rales or rhonchi detected.  ABDOMEN:  Tender in the epigastric region and radiates to the right upper   quadrant, no hepatomegaly, no splenomegaly.  GENITAL:  Deferred.  RECTAL:  Deferred.  EXTREMITIES:  Upper and lower extremities, positive pulses, no edema, good   muscle strength, good muscle tone, positive deep tendon reflexes.  NEUROLOGIC:  Cranial nerves II-XII grossly within normal limits without any   focal deficits appreciated.  SKIN:  Normal turgor.  No rashes or abrasions identified.    LABORATORY EVALUATION:  WBC count 5.4, hemoglobin 12.8, hematocrit 39.4,   platelets are 474.  Her MCV is low at 77.  Sodium 134, potassium 3.4, chloride   99, CO2 25, BUN 6, creatinine 0.75, calcium 9.2 and a glucose 100.  Liver   functions are all elevated.  AST is 254, , alkaline  phosphatase 323,   total bilirubin 4.4, albumin is normal at 4.3 and total protein is up at 8.5.    INR is 1.  Ultrasound of the gallbladder at this point shows   choledocholithiasis with extrahepatic biliary dilation.  CT of the abdomen and   pelvis at this point shows cholelithiasis choledocholithiasis and no evidence   of biliary dilation, no evidence of urolithiasis or other cause of   obstructive uropathy.  MRI of the abdomen is pending.  A HIDA scan at this   point is not necessary as the patient has a common bile duct dilation.    IMPRESSION AND PLAN:  At this point:  1.  Acute cholecystitis with common bile duct stone.  GI has been consulted   and MRCP will be done and then ERCP will need to follow.  Once the patient has   had ERCP done, surgery then can remove her gallbladder.  In the meantime, the   patient will be given fluid resuscitation and she will be kept n.p.o.  She   will be given antibiotics with Rocephin and Flagyl.  Patient will continue at   this point with pain management using IV fentanyl 25-50 mcg every 1 hour.  2.  For her hypothyroidism, continue with Synthroid supplementation.  Check a   TSH level.  The patient will be given antiemetics.  The patient at this point   is observation placement to the surgical floor.       ____________________________________     MD AURELIO JOHNSON / QUIQUE    DD:  03/06/2017 00:30:59  DT:  03/06/2017 00:55:03    D#:  [id number]  Job#:  [id number]    cc: NING RODRIGUEZ DO

## 2017-03-06 NOTE — DISCHARGE PLANNING
Patient discussed in morning rounds. Patient reportedly lives at home with her spouse and her discharge plan is to return home when medically able.  No current SS needs noted.

## 2017-03-06 NOTE — ED NOTES
Pink warm and dry in triage Pt reports pain is int and worse with eating  and drinking Denies fever

## 2017-03-06 NOTE — ED PROVIDER NOTES
ED Provider Note    CHIEF COMPLAINT  Chief Complaint   Patient presents with   • Abdominal Pain     Epigastric since Thurs Non rad To  today and sent to ED R/O choly   • N/V       HPI  Maame Enrique is a 36 y.o. female who presents complaining of epigastric abdominal pain that is worse when she eats or drinks anything. She states that she has been hurting since Thursday and the pain is worse every time she eats. She describes the pain as epigastric, cramping in nature with radiation into her back. She denies any fevers or chills. She only has a few small drinks a week. She denies any diarrhea, dysuria, frequency or urgency. She is nauseous but not vomiting. Nothing makes her symptoms better. She has no history of peptic ulcer disease. The patient states that she had a baby a year ago and has not had any complications of pelvic pain or vaginal discharge. Her only surgery is removal of uterine fibroids. She is otherwise healthy and not taking any other medications.    REVIEW OF SYSTEMS  HEENT:  No ear pain, congestion or sore throat   EYES: no discharge redness or vision changes  CARDIAC: no chest pain, palpitations    PULMONARY: no dyspnea, cough or congestion   GI: See history of present illness  : no dysuria, back pain or hematuria   Neuro: no weakness, numbness aphasia or headache  Musculoskeletal: no swelling deformity or pain no joint swelling  Endocrine: no fevers, sweating, weight loss   SKIN: no rash, erythema or contusions     See history of present illness all other systems are negative      PAST MEDICAL HISTORY  Past Medical History   Diagnosis Date   • Thyroid disease        FAMILY HISTORY  History reviewed. No pertinent family history.    SOCIAL HISTORY  Social History     Social History   • Marital Status:      Spouse Name: N/A   • Number of Children: N/A   • Years of Education: N/A     Social History Main Topics   • Smoking status: Former Smoker     Types: Cigarettes   • Smokeless  "tobacco: None   • Alcohol Use: Yes      Comment: social   • Drug Use: No   • Sexual Activity:     Partners: Male     Other Topics Concern   • None     Social History Narrative       SURGICAL HISTORY  History reviewed. No pertinent past surgical history.    CURRENT MEDICATIONS  Home Medications     Reviewed by Dylon Jernigan M.D. (Physician) on 03/05/17 at 2230  Med List Status: Complete    Medication Last Dose Status    Fluticasone Propionate (FLONASE NA) prn Active    LEVOTHYROXINE SODIUM 3/5/2017 Active    NORETHINDRONE PO 3/5/2017 Active    Prenatal Vit-Fe Fumarate-FA (PRENATAL COMPLETE PO) taking Active                 ALLERGIES  No Known Allergies    PHYSICAL EXAM  VITAL SIGNS: /78 mmHg  Pulse 70  Temp(Src) 37.2 °C (98.9 °F)  Ht 1.676 m (5' 6\")  Wt 120.3 kg (265 lb 3.4 oz)  BMI 42.83 kg/m2  SpO2 97%  LMP 02/09/2017      Constitutional: Well developed, Well nourished, No acute distress, Non-toxic appearance.   HENT: Normocephalic, Atraumatic, Bilateral external ears normal, Oropharynx moist, No oral exudates, Nose normal.   Eyes: PERRLA, EOMI, Conjunctiva normal, No discharge.   Neck: Normal range of motion, No tenderness, Supple, No stridor.   Lymphatic: No lymphadenopathy noted.   Cardiovascular: Normal heart rate, Normal rhythm, No murmurs, No rubs, No gallops.   Thorax & Lungs: Normal breath sounds, No respiratory distress, No wheezing, No chest tenderness.   Abdomen: Positive epigastric tenderness to palpation, worse in the right upper quadrant. No rebound, masses or peritoneal signs  Skin: Warm, Dry, No erythema, No rash.   Back: No tenderness, No CVA tenderness.   Extremities: Intact distal pulses, No edema, No tenderness, No cyanosis, No clubbing.   Neurologic: Alert & oriented x 3, Normal motor function, Normal sensory function, No focal deficits noted.       RADIOLOGY/PROCEDURES  CT-ABDOMEN-PELVIS W/O   Final Result      1.  Cholelithiasis   2.  Choledocholithiasis   3.  No evidence of " biliary dilatation   4.  No evidence of urolithiasis or other cause of obstructive uropathy         US-GALLBLADDER    (Results Pending)   NM-BILIARY (HIDA) SCAN WITH CCK    (Results Pending)   [id number]-[id number]-E/O    (Results Pending)         COURSE & MEDICAL DECISION MAKING  Pertinent Labs & Imaging studies reviewed. (See chart for details)  Differential diagnosis: Cholecystitis, peptic ulcer disease, pancreatitis, hepatitis    Results for orders placed or performed during the hospital encounter of 03/05/17   CBC WITH DIFFERENTIAL   Result Value Ref Range    WBC 5.4 4.8 - 10.8 K/uL    RBC 5.12 4.20 - 5.40 M/uL    Hemoglobin 12.8 12.0 - 16.0 g/dL    Hematocrit 39.4 37.0 - 47.0 %    MCV 77.0 (L) 81.4 - 97.8 fL    MCH 25.0 (L) 27.0 - 33.0 pg    MCHC 32.5 (L) 33.6 - 35.0 g/dL    RDW 39.7 35.9 - 50.0 fL    Platelet Count 474 (H) 164 - 446 K/uL    MPV 9.3 9.0 - 12.9 fL    Neutrophils-Polys 58.90 44.00 - 72.00 %    Lymphocytes 29.30 22.00 - 41.00 %    Monocytes 8.50 0.00 - 13.40 %    Eosinophils 1.30 0.00 - 6.90 %    Basophils 1.80 0.00 - 1.80 %    Immature Granulocytes 0.20 0.00 - 0.90 %    Nucleated RBC 0.00 /100 WBC    Neutrophils (Absolute) 3.19 2.00 - 7.15 K/uL    Lymphs (Absolute) 1.59 1.00 - 4.80 K/uL    Monos (Absolute) 0.46 0.00 - 0.85 K/uL    Eos (Absolute) 0.07 0.00 - 0.51 K/uL    Baso (Absolute) 0.10 0.00 - 0.12 K/uL    Immature Granulocytes (abs) 0.01 0.00 - 0.11 K/uL    NRBC (Absolute) 0.00 K/uL   LIPASE   Result Value Ref Range    Lipase 17 7 - 58 U/L   COMP METABOLIC PANEL   Result Value Ref Range    Sodium 134 (L) 135 - 145 mmol/L    Potassium 3.4 (L) 3.6 - 5.5 mmol/L    Chloride 99 96 - 112 mmol/L    Co2 25 20 - 33 mmol/L    Anion Gap 10.0 0.0 - 11.9    Glucose 100 (H) 65 - 99 mg/dL    Bun 6 (L) 8 - 22 mg/dL    Creatinine 0.75 0.50 - 1.40 mg/dL    Calcium 9.2 8.4 - 10.2 mg/dL    AST(SGOT) 254 (H) 12 - 45 U/L    ALT(SGPT) 528 (H) 2 - 50 U/L    Alkaline Phosphatase 323 (H) 30 - 99 U/L    Total Bilirubin 4.4 (H)  0.1 - 1.5 mg/dL    Albumin 4.3 3.2 - 4.9 g/dL    Total Protein 8.5 (H) 6.0 - 8.2 g/dL    Globulin 4.2 (H) 1.9 - 3.5 g/dL    A-G Ratio 1.0 g/dL   HCG QUAL SERUM   Result Value Ref Range    Beta-Hcg Qualitative Serum Negative Negative   ESTIMATED GFR   Result Value Ref Range    GFR If African American >60 >60 mL/min/1.73 m 2    GFR If Non African American >60 >60 mL/min/1.73 m 2   ACETAMINOPHEN   Result Value Ref Range    Acetomenophen -Tylenol <10 10 - 30 ug/mL   POC UA   Result Value Ref Range    POC Color Yellow     POC Appearance Clear     POC Glucose Negative Negative mg/dL    POC Ketones >=160 (A) Negative mg/dL    POC Specific Gravity >=1.030 (A) 1.005-1.030    POC Blood Trace-lysed (A) Negative    POC Urine PH 5.5 5.0-8.0    POC Protein Trace (A) Negative mg/dL    POC Nitrites Negative Negative    POC Leukocyte Esterase Negative Negative   POC UA   Result Value Ref Range    POC Color Valery     POC Appearance Clear     POC Glucose Negative Negative mg/dL    POC Ketones >=160 (A) Negative mg/dL    POC Specific Gravity >=1.030 (A) 1.005-1.030    POC Blood Trace-lysed (A) Negative    POC Urine PH 5.5 5.0-8.0    POC Protein Trace (A) Negative mg/dL    POC Nitrites Negative Negative    POC Leukocyte Esterase Negative Negative        An IV was started and the patient is not requesting any pain medication at this time. She states that her symptoms are improved. As long as she doesn't eat.  Her that her liver function tests are elevated as well as her bilirubin. She could have a retained stone and her CT does show cholelithiasis.. I spoke with GI, Dr. Yung and he will follow up with her in the hospital for further evaluation. She could have a retained stone. I will admit her to the hospital with IV antibiotics, fluids and pain control. The patient understands her need to stay at this time.    FINAL IMPRESSION  1. Epigastric pain    2. Elevated LFTs          Electronically signed by: Margarita Tarango, 3/5/2017 8:19 PM

## 2017-03-06 NOTE — PROGRESS NOTES
Pt transported back to unit via hospital bed. Assumed care of pt. Pt sitting up in bed. Pt A&Ox4. POC discussed. Pt verbalized understanding. No signs of distress or discomfort noted at this time. Pt instructed to use call light for assistance. Call light and personal belongings within reach. Pt denies any concerns at this time. All questions answered. Safety measures in place. Will continue to monitor.

## 2017-03-06 NOTE — PROGRESS NOTES
Report received from Kamala GANNON. Assumed care of pt. Pt sitting up in bed. Pt A&Ox4. POC discussed. Pt verbalized understanding. No signs of distress or discomfort noted at this time. Pt instructed to use call light for assistance. Call light and personal belongings within reach. Pt denies any concerns at this time. All questions answered. Safety measures in place. Will continue to monitor.

## 2017-03-06 NOTE — PROGRESS NOTES
Admit to 212-2 from ED. Ambulate from gurney to bed, gait steady. Alert and oriented. Respirations even and unlabored. No c/o pain at this time. Assessment completed. IVF and IV ABX hung and infusing without issue. Call light instructions given, in reach at all times.

## 2017-03-06 NOTE — PROGRESS NOTES
Subjective:      Maame Enrique is a 36 y.o. female who presents with Abdominal Pain    PMH:  has a past medical history of Thyroid disease.  MEDS: No current facility-administered medications for this visit.  No current outpatient prescriptions on file.    Facility-Administered Medications Ordered in Other Visits:   •  fentaNYL (SUBLIMAZE) injection 25-50 mcg, 25-50 mcg, Intravenous, Q HOUR PRN, Dylon Jernigan M.D.  •  potassium chloride SA (Kdur) tablet 40 mEq, 40 mEq, Oral, Once, Gema Churchill, A.P.N.  •  levothyroxine (SYNTHROID) tablet 200 mcg, 200 mcg, Oral, AM ES, Dylon Jernigan M.D., Stopped at 03/06/17 0630  •  senna-docusate (PERICOLACE or SENOKOT S) 8.6-50 MG per tablet 2 Tab, 2 Tab, Oral, BID, Stopped at 03/06/17 0900 **AND** polyethylene glycol/lytes (MIRALAX) PACKET 1 Packet, 1 Packet, Oral, QDAY PRN **AND** magnesium hydroxide (MILK OF MAGNESIA) suspension 30 mL, 30 mL, Oral, QDAY PRN **AND** bisacodyl (DULCOLAX) suppository 10 mg, 10 mg, Rectal, QDAY PRN, Dylon Jernigan M.D.  •  0.9 % NaCl with KCl 20 mEq infusion, , Intravenous, Continuous, Dylon Jernigan M.D., Last Rate: 100 mL/hr at 03/06/17 0104  •  acetaminophen (TYLENOL) tablet 650 mg, 650 mg, Oral, Q6HRS PRN, Dylon Jernigan M.D.  •  labetalol (NORMODYNE,TRANDATE) injection 10 mg, 10 mg, Intravenous, Q4HRS PRN, Dylon Jernigan M.D.  •  ondansetron (ZOFRAN) syringe/vial injection 4 mg, 4 mg, Intravenous, Q4HRS PRN, Dylon Jernigan M.D.  •  ondansetron (ZOFRAN ODT) dispertab 4 mg, 4 mg, Oral, Q4HRS PRN, Dylon Jernigan M.D.  •  promethazine (PHENERGAN) tablet 12.5-25 mg, 12.5-25 mg, Oral, Q4HRS PRN, Dylon Jernigan M.D.  •  promethazine (PHENERGAN) suppository 12.5-25 mg, 12.5-25 mg, Rectal, Q4HRS PRN, Dylon Jernigan M.D.  •  prochlorperazine (COMPAZINE) injection 5-10 mg, 5-10 mg, Intravenous, Q4HRS PRN, Dylon Jernigan M.D.  •  omeprazole (PRILOSEC) capsule 20 mg, 20 mg, Oral, DAILY, Dylon Jernigan M.D., Stopped at 03/06/17 0900  •   "zolpidem (AMBIEN) tablet 5 mg, 5 mg, Oral, HS PRN - MR X 1, Dylon Jernigan M.D.  ALLERGIES: No Known Allergies  SURGHX: History reviewed. No pertinent past surgical history.  SOCHX:  reports that she has quit smoking. Her smoking use included Cigarettes. She does not have any smokeless tobacco history on file. She reports that she drinks alcohol. She reports that she does not use illicit drugs.  FH: Reviewed with patient/family. Not pertinent to this complaint.           HPI Comments: Patient presents with:  Abdominal Pain: abdominal pain , vomiting x 4 days .  PT states pain is always worse after eating for \"aound 3-4 hours, then gets better.\"    Pt states she has gotten progressively more nauseated and intolerant of PO intake other than small sips of water, until today and she cannot tolerate even water without vomiting and epigastric pain.    PT denies fever, chills, CP, SOB, back pain or diaphoresis.  Denies hematemesis or melena.    Denies hx of gallstones, GERD or PUD.         Abdominal Pain  This is a new problem. The current episode started in the past 7 days. The onset quality is gradual. The problem has been gradually worsening. The pain is located in the epigastric region, RUQ and LUQ. The pain is moderate. The quality of the pain is colicky, burning and a sensation of fullness. The abdominal pain does not radiate. Associated symptoms include nausea and vomiting. Pertinent negatives include no constipation, fever or melena. The pain is aggravated by eating (all po intake). Relieved by: time. Treatments tried: reduction of solids, tried only liquids. The treatment provided no relief. There is no history of Crohn's disease, gallstones, GERD, pancreatitis or PUD.       Review of Systems   Constitutional: Negative for fever and chills.   Respiratory: Negative for cough, shortness of breath and stridor.    Cardiovascular: Negative for chest pain and palpitations.   Gastrointestinal: Positive for nausea, " "vomiting and abdominal pain. Negative for heartburn, constipation, blood in stool and melena.   Genitourinary: Negative.    All other systems reviewed and are negative.         Objective:     /76 mmHg  Pulse 80  Temp(Src) 36.6 °C (97.8 °F)  Resp 14  Ht 1.651 m (5' 5\")  Wt 117.935 kg (260 lb)  BMI 43.27 kg/m2  SpO2 97%  LMP 02/09/2017  Breastfeeding? Yes     Physical Exam   Constitutional: She appears well-developed and well-nourished. No distress.   HENT:   Head: Normocephalic.   Nose: Nose normal.   Mouth/Throat: Uvula is midline, oropharynx is clear and moist and mucous membranes are normal.   Eyes: Conjunctivae and EOM are normal. Pupils are equal, round, and reactive to light.   Neck: Normal range of motion. Neck supple.   Cardiovascular: Normal rate.    Pulmonary/Chest: Effort normal.   Abdominal: There is tenderness in the epigastric area. There is positive Moss's sign. There is no rigidity, no rebound, no guarding and no CVA tenderness.   Musculoskeletal: Normal range of motion.   Neurological: She is alert.   Skin: Skin is warm and dry.   Psychiatric: She has a normal mood and affect.   Nursing note and vitals reviewed.         UA:  +Ketones, protein, biliruben    Assessment/Plan:     1. Epigastric abdominal pain  POCT Urinalysis   2. Nausea and vomiting, intractability of vomiting not specified, unspecified vomiting type  POCT Urinalysis     I suspect that this patient is having gallbladder issues and discussed this with her.  She denies history of gallbladder disease, but her symptoms and physical exam are consistent with this etiology.  In this urgent care setting I am unable to do timely labs and US study, and I believe this patient has some obstruction since she has been unable to tolerate almost any PO intake for several days.  I am referring the pt to the ER for further evaluation .      PT requires evaluation and treatment at a facility that can provide a higher level of care due to " acuity of illness/complaint.  Pt states she will go home first and arrange care of her infant and then go to the ER with her .

## 2017-03-06 NOTE — DIETARY
NUTRITION SERVICES: BMI - Pt with BMI >40 (42.83). Weight loss counseling not appropriate in acute care setting. RECOMMEND - Referral to outpatient nutrition services for weight management after D/C.

## 2017-03-06 NOTE — PROGRESS NOTES
Hospital Medicine Progress Note, Adult, Complex               Author: Gema Brockkristen Date & Time created: 3/6/2017  6:42 AM     Interval History:  37 y/o F with hypothyroid that presented with BUQ abd pain, US found cholelithiasis with choledocholithiasis with extrahepatic biliary dilatation. GI Dr Coreas has consulted and is taking her for ERCP today. Dr Norris has been called for surgical consult for lap paulino consideration.    Review of Systems:  Review of Systems   Constitutional: Negative.  Negative for fever, chills and malaise/fatigue.   HENT: Negative.    Eyes: Negative.    Respiratory: Negative.  Negative for shortness of breath.    Cardiovascular: Negative.  Negative for chest pain, palpitations and leg swelling.   Gastrointestinal: Positive for nausea and abdominal pain (BUQ). Negative for heartburn, vomiting, diarrhea and constipation.   Genitourinary: Negative.  Negative for dysuria, urgency and frequency.   Musculoskeletal: Negative.  Negative for myalgias and joint pain.   Skin: Negative.    Neurological: Negative.  Negative for dizziness, focal weakness, weakness and headaches.   Endo/Heme/Allergies: Negative.  Does not bruise/bleed easily.   Psychiatric/Behavioral: Negative.  Negative for depression.       Physical Exam:  Physical Exam   Constitutional: She is oriented to person, place, and time. She appears well-developed and well-nourished.   HENT:   Head: Normocephalic and atraumatic.   Mouth/Throat: Oropharynx is clear and moist. No oropharyngeal exudate.   Eyes: Conjunctivae are normal. Right eye exhibits no discharge. Left eye exhibits no discharge. No scleral icterus.   Neck: Normal range of motion. Neck supple. No tracheal deviation present.   Cardiovascular: Normal rate, regular rhythm, normal heart sounds and intact distal pulses.    Pulmonary/Chest: Effort normal and breath sounds normal.   Abdominal: Soft. Bowel sounds are normal. She exhibits no distension and no mass. There is  tenderness (R>L upper quads). There is no rebound and no guarding.   Musculoskeletal: Normal range of motion. She exhibits no edema or tenderness.   Neurological: She is alert and oriented to person, place, and time.   Skin: Skin is warm and dry. No rash noted. She is not diaphoretic. No erythema.   Psychiatric: She has a normal mood and affect. Her behavior is normal. Judgment and thought content normal.       Labs:        Invalid input(s): [id number]      Recent Labs      17   SODIUM  134*   POTASSIUM  3.4*   CHLORIDE  99   CO2  25   BUN  6*   CREATININE  0.75   CALCIUM  9.2     Recent Labs      17   ALTSGPT  528*   ASTSGOT  254*   ALKPHOSPHAT  323*   TBILIRUBIN  4.4*   LIPASE  17   GLUCOSE  100*     Recent Labs      17   2200   RBC  5.12   --    HEMOGLOBIN  12.8   --    HEMATOCRIT  39.4   --    PLATELETCT  474*   --    PROTHROMBTM   --   13.1   INR   --   1.01     Recent Labs      17   WBC  5.4   NEUTSPOLYS  58.90   LYMPHOCYTES  29.30   MONOCYTES  8.50   EOSINOPHILS  1.30   BASOPHILS  1.80   ASTSGOT  254*   ALTSGPT  528*   ALKPHOSPHAT  323*   TBILIRUBIN  4.4*           Hemodynamics:  Temp (24hrs), Av.9 °C (98.4 °F), Min:36.6 °C (97.9 °F), Max:37.2 °C (98.9 °F)  Temperature: 36.6 °C (97.9 °F)  Pulse  Av.5  Min: 67  Max: 76   Blood Pressure: 112/64 mmHg     Respiratory:    Respiration: 16, Pulse Oximetry: 97 %           Fluids:    Intake/Output Summary (Last 24 hours) at 17 0642  Last data filed at 17 0600   Gross per 24 hour   Intake    650 ml   Output      0 ml   Net    650 ml     Weight: 120.3 kg (265 lb 3.4 oz)  GI/Nutrition:  Orders Placed This Encounter   Procedures   • DIET NPO     Standing Status: Standing      Number of Occurrences: 1      Standing Expiration Date:      Order Specific Question:  Restrict to:     Answer:  Sips with Medications [3]     Medical Decision Making, by Problem:  Active Hospital Problems     Diagnosis   • *Cholelithiasis and cholecystitis with obstruction [K80.19]  Pain controlled well today  ERCP with GI today  Dr Sands to consult surgery Dr Norris for consideration for lap paulino  Cont IV    • Hypothyroid [E03.9]cont pt synthroid   Hypokalemia- replete PO and IV and trend BMP  Hyponatremia- mild, repleted IV , improved, cont to trend BMP    Radiology images reviewed, Labs reviewed and Medications reviewed  Loomis catheter: No Loomis      DVT Prophylaxis: Contraindicated - High bleeding risk (surgery expected)  DVT prophylaxis - mechanical: Not indicated at this time, ambulatory            Dispo- awaiting ERCP today and surgical consult Dr Norris, dc plans dependent on surgical plans and pain improvement

## 2017-03-06 NOTE — ED NOTES
Urine sample was accidentally ran twice, urine color was not yellow as originally documented.  Urine was Valery in color.

## 2017-03-06 NOTE — OR SURGEON
Immediate Post-Operative Note      PreOp Diagnosis: obstructive jaundice, abnormal biliary imaging    PostOp Diagnosis: choledocholithiasis with obstruction    Procedure(s):  ERCP biliary sphincterotomy  ERCP common bile duct stones extraction  Cholangiogram    Endoscopist(s):  Basim Almanza M.D., [facility]    Anesthesiologist/Type of Anesthesia:  Anesthesiologist: Suma Medina M.D./General    Surgical Staff:  Circulator: Jacinto Velázquez R.N.  Endoscopy Technician: Ivy Sr    Times:  11:30am pre-op asssement   11:46am room in  11:59am start  12:07pm stop  12:15pm room out    Specimen: none    Estimated Blood Loss: none    Findings: multiple, more than 15, yellowish common bile duct stones balloon extracted    Complications: none        3/6/2017 11:47 AM Basim Almanza

## 2017-03-06 NOTE — H&P
"Surgical History and Physical    Date: 3/6/2017    Requesting Physician: Dr. Sands, hospialist service  PCP: Joey Greenfield D.O.  Attending Physician: Brianna Norris M.D. [facility]    CC: \"my stomach hurt so bad, I thought I had food poisoning\"    HPI: This is a 36 y.o. female who is presenting with complaints of epigastric abdominal pain, nausea and vomiting. The pain is in her epigastrium and radiates to her right upper abdomen and back, described as stabbing, severity is 10/10. It has been going on since Thursday evening last. It is worsened with movement and alleviated by pain medication. Along with this she has had nausea, vomiting, denies fevers or chills. She has never had an episode of this in the past. ER workup revealed cholelithiasis and choledocholithiasis. She underwent ERCP earlier today, which revealed multiple stones in the CBD. She underwent ERCP and biliary sphincterotomy.       Past Medical History   Diagnosis Date   • Thyroid disease    morbid obesity (Body mass index is 42.83 kg/(m^2).)    Surgical Hx: D&C, denies abdominal surgeries    Current Facility-Administered Medications   Medication Dose Route Frequency Provider Last Rate Last Dose   • fentaNYL (SUBLIMAZE) injection 25-50 mcg  25-50 mcg Intravenous Q HOUR PRN Dylon Jernigan M.D.       • potassium chloride SA (Kdur) tablet 40 mEq  40 mEq Oral Once Gema A Micone, A.P.N.   Stopped at 03/06/17 0915   • levothyroxine (SYNTHROID) tablet 200 mcg  200 mcg Oral AM ES Dylon Jernigan M.D.   Stopped at 03/06/17 0630   • senna-docusate (PERICOLACE or SENOKOT S) 8.6-50 MG per tablet 2 Tab  2 Tab Oral BID Dylon Jernigan M.D.   Stopped at 03/06/17 0900    And   • polyethylene glycol/lytes (MIRALAX) PACKET 1 Packet  1 Packet Oral QDAY PRN Dylon Jernigan M.D.        And   • magnesium hydroxide (MILK OF MAGNESIA) suspension 30 mL  30 mL Oral QDAY PRN Dylon Jernigan M.D.        And   • bisacodyl (DULCOLAX) suppository 10 mg  10 mg Rectal QDAY " PRN Dylon Jernigan M.D.       • 0.9 % NaCl with KCl 20 mEq infusion   Intravenous Continuous Dylon Jernigan M.D. 100 mL/hr at 03/06/17 0104     • acetaminophen (TYLENOL) tablet 650 mg  650 mg Oral Q6HRS PRN Dylon Jernigan M.D.       • labetalol (NORMODYNE,TRANDATE) injection 10 mg  10 mg Intravenous Q4HRS PRN Dylon Jernigan M.D.       • ondansetron (ZOFRAN) syringe/vial injection 4 mg  4 mg Intravenous Q4HRS PRN Dylon Jernigan M.D.       • ondansetron (ZOFRAN ODT) dispertab 4 mg  4 mg Oral Q4HRS PRN Dylon Jernigan M.D.       • promethazine (PHENERGAN) tablet 12.5-25 mg  12.5-25 mg Oral Q4HRS PRN Dylon Jernigan M.D.       • promethazine (PHENERGAN) suppository 12.5-25 mg  12.5-25 mg Rectal Q4HRS PRN Dylon Jernigan M.D.       • prochlorperazine (COMPAZINE) injection 5-10 mg  5-10 mg Intravenous Q4HRS PRN Dylon Jernigan M.D.       • omeprazole (PRILOSEC) capsule 20 mg  20 mg Oral DAILY Dylon Jernigan M.D.   Stopped at 03/06/17 0900   • zolpidem (AMBIEN) tablet 5 mg  5 mg Oral HS PRN - MR X 1 Dylon Jernigan M.D.         Home meds: flonase, birth control, synthroid    Social History     Social History   • Marital Status:      Spouse Name: N/A   • Number of Children: N/A   • Years of Education: N/A     Occupational History   • Not on file.     Social History Main Topics   • Smoking status: Former Smoker     Types: Cigarettes   • Smokeless tobacco: Not on file   • Alcohol Use: Yes      Comment: social   • Drug Use: No   • Sexual Activity:     Partners: Male     Other Topics Concern   • Not on file     Social History Narrative     FH: denies    Allergies:  Review of patient's allergies indicates no known allergies.    Review of Systems:  Constitutional: Negative for fever, chills, weight loss, malaise/fatigue and diaphoresis.   HENT: Negative for hearing loss, ear pain, nosebleeds, congestion, sore throat, neck pain, tinnitus and ear discharge.    Eyes: Negative for blurred vision, double vision, photophobia, pain,  "discharge and redness.   Respiratory: Negative for cough, hemoptysis, sputum production, shortness of breath, wheezing and stridor.    Cardiovascular: Negative for chest pain, palpitations, orthopnea, claudication, leg swelling and PND.   Gastrointestinal: Negative for heartburn, nausea, vomiting, abdominal pain, diarrhea, constipation, blood in stool and melena.   Genitourinary: Negative for dysuria, urgency, frequency, hematuria and flank pain.   Musculoskeletal: Negative for myalgias, back pain, joint pain and falls.   Skin: Negative for itching and rash.  Neurological: Negative for dizziness, tingling, tremors, sensory change, speech change, focal weakness, seizures, loss of consciousness, weakness and headaches.   Endo/Heme/Allergies: Negative for environmental allergies and polydipsia. Does not bruise/bleed easily.   Psychiatric/Behavioral: Negative for depression, suicidal ideas, hallucinations, memory loss and substance abuse. The patient is not nervous/anxious and does not have insomnia.    Physical Exam:  Blood pressure 116/64, pulse 67, temperature 36.8 °C (98.3 °F), resp. rate 17, height 1.676 m (5' 6\"), weight 120.3 kg (265 lb 3.4 oz), last menstrual period 02/09/2017, SpO2 94 %, not currently breastfeeding.    Constitutional: she is oriented to person, place, and time.  she appears well-developed and well-nourished. No distress.   Head: Normocephalic and atraumatic.   Neck: Normal range of motion. Neck supple. No JVD present. No tracheal deviation present. No thyromegaly present.   Cardiovascular: Normal rate, regular rhythm, normal heart sounds and intact distal pulses.  Exam reveals no gallop and no friction rub.  No murmur heard.  Pulmonary/Chest: Effort normal and breath sounds normal. No stridor. No respiratory distress. she has no wheezes. she has no rales.   Abdominal: Soft, obese. Bowel sounds are normal. Mild epigastric TTP. Negative Moss's sign.  Musculoskeletal: Normal range of motion. she " exhibits no edema and no tenderness.   Neurological: she is alert and oriented to person, place, and time. she has normal reflexes. No cranial nerve deficit. Coordination normal.   Skin: Skin is warm and dry. No rash noted. she is not diaphoretic. No erythema. No pallor.   Psychiatric: she has a normal mood and affect.  Behavior is normal.       Labs:  Recent Labs      03/05/17 2000 03/06/17   0640   WBC  5.4  3.3*   RBC  5.12  4.54   HEMOGLOBIN  12.8  11.5*   HEMATOCRIT  39.4  36.2*   MCV  77.0*  79.7*   MCH  25.0*  25.3*   MCHC  32.5*  31.8*   RDW  39.7  42.0   PLATELETCT  474*  383   MPV  9.3  8.8*     Recent Labs      03/05/17 2000 03/06/17   0640   SODIUM  134*  136   POTASSIUM  3.4*  3.4*   CHLORIDE  99  103   CO2  25  25   GLUCOSE  100*  98   BUN  6*  <5*   CREATININE  0.75  0.80   CALCIUM  9.2  8.6     Recent Labs      03/05/17   2200   INR  1.01     Recent Labs      03/05/17 2000 03/05/17   2200   ASTSGOT  254*   --    ALTSGPT  528*   --    TBILIRUBIN  4.4*   --    ALKPHOSPHAT  323*   --    GLOBULIN  4.2*   --    INR   --   1.01       Radiology:  CT abdomen/pelvis:  3/5/2017 9:55 PM    HISTORY/REASON FOR EXAM:  RIGHT upper quadrant abdominal pain    TECHNIQUE/EXAM DESCRIPTION:  CT scan of the abdomen and pelvis without contrast, 3/5/2017 9:55 PM.    Unenhanced helical scanning was obtained from the diaphragmatic domes through the pubic symphysis without intravenous contrast.    Low dose optimization technique was utilized for this CT exam including automated exposure control and adjustment of the mA and/or kV according to patient size.    COMPARISON: No prior studies available.    FINDINGS: The study is limited due to non-use of intravenous contrast.    CT Abdomen:  There is no evidence of focal consolidation or pleural effusion seen within the lung bases.  The liver is normal in appearance.  Gallstones are present. There is calcific density within the distal common bile duct.  The pancreas is  normal.  The spleen is normal.  The adrenal glands are normal.  The kidneys are normal.  The aorta is normal in caliber.  No evidence of retroperitoneal adenopathy.      CT Pelvis:  There is no evidence of bowel obstruction or inflammatory change.  The appendix is visualized and appears normal.  There is no evidence of free fluid.  No acute or suspicious osseous lesion is seen.           Impression        1.  Cholelithiasis  2.  Choledocholithiasis  3.  No evidence of biliary dilatation  4.  No evidence of urolithiasis or other cause of obstructive uropathy     3/5/2017 11:33 PM    HISTORY/REASON FOR EXAM:  Cholelithiasis and abdominal pain      TECHNIQUE/EXAM DESCRIPTION AND NUMBER OF VIEWS:  Real-time sonography of the gallbladder.    COMPARISON: Unenhanced CT of the abdomen and pelvis 3/5/2017    FINDINGS:  The liver is normal in contour. There is no evidence of solid mass lesion.    Gallstones are present. There is no sonographic Moss's sign. The gallbladder wall thickness measures 0.28 cm  There is no pericholecystic fluid.    The common duct measures 8-10 mm in diameter. Stones are seen in the common duct.    The visualized pancreas is unremarkable.  The visualized aorta is normal in caliber.    Intrahepatic IVC is patent.    The portal vein is patent with hepatopetal flow.    The right kidney measures 11.70 cm.    There is no ascites.         Impression        1.  Cholelithiasis  2.  Choledocholithiasis with extrahepatic biliary dilatation     The radiologist's interpretation of all images has been reviewed by me.     Assessment: This is a 36 y.o. female with cholelithiasis, possible acute cholecystitis, as well as resolved choledocholithiasis without gallstone pancreatitis.    An extensive procedures, alternative, risks and questions conference was held with the patient and her , in regard to the surgical treatment of cholelithasis and cholecystitis. The patient was counseled regarding the benefits  of the operation, namely, removal of gallbladder and alleviation of her infection and symptoms, and prevention of further episodes. The patient was made aware of the alternatives, including operative and non-operative management. The risks of bleeding, infection, damage to surrounding structures, need for reoperation, need for open operation, bile duct injury, stroke, MI, and death were discussed with the patient. The patient was given a chance to ask questions, and all her questions were answered. Discussion was undertaken with Layman's terms. The patient and family demonstrated adequate understanding, seemed pleased with the plan, and wish to proceed. Consent was given in clear state of mind.  Consent to be signed.     Plan: Laparoscopic cholecystectomy this evening.     Thank you very much for this consultation.    Brianna Norris M.D.  [facility]  [phone]

## 2017-03-06 NOTE — PROGRESS NOTES
Resting with eyes closed. Respirations even and unlabored. Repositions self. IV infusing without issue. Call light in reach.

## 2017-03-06 NOTE — CONSULTS
Gastroenterology Consult Note:    Derek Coreas  Date & Time note created:    3/5/2017   10:43 PM     Patient ID:  Name:             Maame Enrique  YOB: 1980  Age:                 36 y.o.  female  MRN:               [id number]    Referring MD:  Dr. Jernigan                                                             Chief Complaint(s):      Obstructive jaundice, RUQ abd pain    History of Present Illness:    This is a very pleasant 36 y.o. female with the past medical history as listed below.    She has been having sharp postprandial pains since Thursday 3/2/17 in the epigastric region radiating into the right and left upper quadrants, 10/10 in intensity. She never had this kind of pain before. It is intermittent, when it comes on it is 10/10.  It is usually after she eats that it comes on, there is accompanying nausea, vomiting and headache.  Her urine has been turning darker too. No fevers or chills.  No chest pain, no shortness of breath. She is thus afraid to eat.    She came to [facility] ER, and abd US was not available, so CT A/P was done, which showed cholelithiasis and choledocholithiasis. She was thus admitted and I was consulted.     Otherwise the patient is doing fine without complaints of fever/chills/weight loss/dysphagia/odynophagia/heartburn/bloating/diarrhea/melena/hematochezia.    Review of Systems:      Constitutional: Denies fevers, weight changes  Eyes: Denies changes in vision, jaundice  Ears/Nose/Throat/Mouth: Denies nasal congestion or sore throat   Cardiovascular: Denies chest pain, Denies palpitations   Respiratory: Denies shortness of breath, denies cough  Gastrointestinal/Hepatic: Pos abdominal pain, nausea, vomiting. Denies diarrhea, constipation or GI bleeding   Genitourinary: Denies dysuria or frequency  Musculoskeletal/Rheum: Denies  joint pain and swelling, edema  Skin: Denies rash  Neurological: Denies headache, confusion, memory loss or focal  weakness/parasthesias  Psychiatric: denies mood disorder   Endocrine: Shaylee thyroid problems  Heme/Oncology/Lymph Nodes: Denies enlarged lymph nodes, denies brusing or known bleeding disorder  All other systems were reviewed and are negative (AMA/CMS criteria)              Past Medical History:   Past Medical History   Diagnosis Date   • Thyroid disease      Active Hospital Problems    Diagnosis   • Abdominal pain [R10.9]       Past Surgical History:  History reviewed. No pertinent past surgical history.    Hospital Medications:  Current Facility-Administered Medications   Medication Dose Frequency Provider Last Rate Last Dose   • cefTRIAXone (ROCEPHIN) injection 1 g  1 g Once Margarita Tarango M.D.       • metronidazole (FLAGYL) IVPB 500 mg  500 mg Once Margarita Tarango M.D.       • NS infusion   Once Margarita Tarango M.D.       • [START ON 3/6/2017] levothyroxine (SYNTHROID) tablet 200 mcg  200 mcg AM ES Dylon Jernigan M.D.       • senna-docusate (PERICOLACE or SENOKOT S) 8.6-50 MG per tablet 2 Tab  2 Tab BID Dylon Jernigan M.D.        And   • polyethylene glycol/lytes (MIRALAX) PACKET 1 Packet  1 Packet QDAY PRN Dylon Jernigan M.D.        And   • magnesium hydroxide (MILK OF MAGNESIA) suspension 30 mL  30 mL QDAY PRN Dylon Jernigan M.D.        And   • bisacodyl (DULCOLAX) suppository 10 mg  10 mg QDAY PRN Dylon Jernigan M.D.       • 0.9 % NaCl with KCl 20 mEq infusion   Continuous Dylon Jernigan M.D.       • acetaminophen (TYLENOL) tablet 650 mg  650 mg Q6HRS PRN Dylon Jernigan M.D.       • labetalol (NORMODYNE,TRANDATE) injection 10 mg  10 mg Q4HRS PRN Dylon Jernigan M.D.       • ondansetron (ZOFRAN) syringe/vial injection 4 mg  4 mg Q4HRS PRN Dylon Jernigan M.D.       • ondansetron (ZOFRAN ODT) dispertab 4 mg  4 mg Q4HRS PRN Dylon Jernigan M.D.       • promethazine (PHENERGAN) tablet 12.5-25 mg  12.5-25 mg Q4HRS PRN Dylon Jernigan M.D.       • promethazine (PHENERGAN) suppository 12.5-25 mg  12.5-25 mg Q4HRS PRN Dylon  "CHANG Jernigan       • prochlorperazine (COMPAZINE) injection 5-10 mg  5-10 mg Q4HRS PRN Dylon Jernigan M.D.       • [START ON 3/6/2017] omeprazole (PRILOSEC) capsule 20 mg  20 mg DAILY Dylon Jernigan M.D.       • zolpidem (AMBIEN) tablet 5 mg  5 mg HS PRN - MR X 1 Dylon Jernigan M.D.         Last reviewed on 3/5/2017 10:30 PM by Dylon Jernigan M.D.    Current Outpatient Medications:    (Not in a hospital admission)    Medication Allergy:  No Known Allergies    Family History:  History reviewed. No pertinent family history.    Social History:  Social History     Social History   • Marital Status:      Spouse Name: N/A   • Number of Children: N/A   • Years of Education: N/A     Occupational History   • Not on file.     Social History Main Topics   • Smoking status: Former Smoker     Types: Cigarettes   • Smokeless tobacco: Not on file   • Alcohol Use: Yes      Comment: social   • Drug Use: No   • Sexual Activity:     Partners: Male     Other Topics Concern   • Not on file     Social History Narrative       Physical Exam:  Weight/BMI: Body mass index is 42.83 kg/(m^2).  Blood pressure 113/78, pulse 70, temperature 37.2 °C (98.9 °F), height 1.676 m (5' 6\"), weight 120.3 kg (265 lb 3.4 oz), last menstrual period 02/09/2017, SpO2 97 %, currently breastfeeding.  Filed Vitals:    03/05/17 1931 03/05/17 1932   BP:  113/78   Pulse:  70   Temp:  37.2 °C (98.9 °F)   Height: 1.676 m (5' 6\")    Weight: 120.3 kg (265 lb 3.4 oz)    SpO2:  97%     Oxygen Therapy:  Pulse Oximetry: 97 %, O2 Delivery: None (Room Air)  No intake or output data in the 24 hours ending 03/05/17 2243    Constitutional:   Well developed, well nourished, no acute distress  HEENT:  Normocephalic, Atraumatic, Conjunctiva not pale, Sclera icteric, Oropharynx moist mucous membranes, No oral exudates, Nose normal.  No thyromegaly. Mallampati I   Neck:  Normal range of motion, No cervical tenderness,  no JVD.  Chest/Lungs:  Symmetric expansion, no spider " angioma, breath sounds clear to auscultation bilaterally,  no crackles, no wheezing.   Cardiovascular:  Normal heart rate, Normal rhythm, No murmurs, No rubs, No gallops.    Abdomen: Bowel sounds normal, Soft, Mild RUQ abd tenderness, No guarding, No rebound, No masses, No hepatosplenomegaly.  Extremities: No cyanosis/clubbing/edema/palmar erythema/flapping tremor  Skin: Warm, Dry, No erythema, No rash, no induration.    MDM (Data Review):     Records reviewed and summarized in current documentation    Lab Data Review:  Recent Results (from the past 24 hour(s))   POCT Urinalysis    Collection Time: 03/05/17  5:15 PM   Result Value Ref Range    POC Color alexander Negative    POC Appearance cloudy Negative    POC Leukocyte Esterase Trace Negative    POC Nitrites Neg Negative    POC Urobiligen Neg Negative (0.2) mg/dL    POC Protein 30 Negative mg/dL    POC Urine PH 6.0 5.0 - 8.0    POC Blood small Negative    POC Specific Gravity 1.030 <1.005 - >1.030    POC Ketones 80 Negative mg/dL    POC Biliruben Large Negative mg/dL    POC Glucose Neg Negative mg/dL   POC UA    Collection Time: 03/05/17  8:00 PM   Result Value Ref Range    POC Color Yellow     POC Appearance Clear     POC Glucose Negative Negative mg/dL    POC Ketones >=160 (A) Negative mg/dL    POC Specific Gravity >=1.030 (A) 1.005-1.030    POC Blood Trace-lysed (A) Negative    POC Urine PH 5.5 5.0-8.0    POC Protein Trace (A) Negative mg/dL    POC Nitrites Negative Negative    POC Leukocyte Esterase Negative Negative   CBC WITH DIFFERENTIAL    Collection Time: 03/05/17  8:00 PM   Result Value Ref Range    WBC 5.4 4.8 - 10.8 K/uL    RBC 5.12 4.20 - 5.40 M/uL    Hemoglobin 12.8 12.0 - 16.0 g/dL    Hematocrit 39.4 37.0 - 47.0 %    MCV 77.0 (L) 81.4 - 97.8 fL    MCH 25.0 (L) 27.0 - 33.0 pg    MCHC 32.5 (L) 33.6 - 35.0 g/dL    RDW 39.7 35.9 - 50.0 fL    Platelet Count 474 (H) 164 - 446 K/uL    MPV 9.3 9.0 - 12.9 fL    Neutrophils-Polys 58.90 44.00 - 72.00 %     Lymphocytes 29.30 22.00 - 41.00 %    Monocytes 8.50 0.00 - 13.40 %    Eosinophils 1.30 0.00 - 6.90 %    Basophils 1.80 0.00 - 1.80 %    Immature Granulocytes 0.20 0.00 - 0.90 %    Nucleated RBC 0.00 /100 WBC    Neutrophils (Absolute) 3.19 2.00 - 7.15 K/uL    Lymphs (Absolute) 1.59 1.00 - 4.80 K/uL    Monos (Absolute) 0.46 0.00 - 0.85 K/uL    Eos (Absolute) 0.07 0.00 - 0.51 K/uL    Baso (Absolute) 0.10 0.00 - 0.12 K/uL    Immature Granulocytes (abs) 0.01 0.00 - 0.11 K/uL    NRBC (Absolute) 0.00 K/uL   LIPASE    Collection Time: 03/05/17  8:00 PM   Result Value Ref Range    Lipase 17 7 - 58 U/L   COMP METABOLIC PANEL    Collection Time: 03/05/17  8:00 PM   Result Value Ref Range    Sodium 134 (L) 135 - 145 mmol/L    Potassium 3.4 (L) 3.6 - 5.5 mmol/L    Chloride 99 96 - 112 mmol/L    Co2 25 20 - 33 mmol/L    Anion Gap 10.0 0.0 - 11.9    Glucose 100 (H) 65 - 99 mg/dL    Bun 6 (L) 8 - 22 mg/dL    Creatinine 0.75 0.50 - 1.40 mg/dL    Calcium 9.2 8.4 - 10.2 mg/dL    AST(SGOT) 254 (H) 12 - 45 U/L    ALT(SGPT) 528 (H) 2 - 50 U/L    Alkaline Phosphatase 323 (H) 30 - 99 U/L    Total Bilirubin 4.4 (H) 0.1 - 1.5 mg/dL    Albumin 4.3 3.2 - 4.9 g/dL    Total Protein 8.5 (H) 6.0 - 8.2 g/dL    Globulin 4.2 (H) 1.9 - 3.5 g/dL    A-G Ratio 1.0 g/dL   HCG QUAL SERUM    Collection Time: 03/05/17  8:00 PM   Result Value Ref Range    Beta-Hcg Qualitative Serum Negative Negative   ESTIMATED GFR    Collection Time: 03/05/17  8:00 PM   Result Value Ref Range    GFR If African American >60 >60 mL/min/1.73 m 2    GFR If Non African American >60 >60 mL/min/1.73 m 2   POC UA    Collection Time: 03/05/17  8:08 PM   Result Value Ref Range    POC Color Valery     POC Appearance Clear     POC Glucose Negative Negative mg/dL    POC Ketones >=160 (A) Negative mg/dL    POC Specific Gravity >=1.030 (A) 1.005-1.030    POC Blood Trace-lysed (A) Negative    POC Urine PH 5.5 5.0-8.0    POC Protein Trace (A) Negative mg/dL    POC Nitrites Negative Negative     POC Leukocyte Esterase Negative Negative   ACETAMINOPHEN    Collection Time: 03/05/17  9:00 PM   Result Value Ref Range    Acetomenophen -Tylenol <10 10 - 30 ug/mL         Imaging/Procedures Review:    3/5/2017 CT:  1.  Cholelithiasis  2.  Choledocholithiasis  3.  No evidence of biliary dilatation  4.  No evidence of urolithiasis or other cause of obstructive uropathy    3/5/17 Abd US:  1.  Cholelithiasis  2.  Choledocholithiasis with extrahepatic biliary dilatation    MDM (Assessment and Plan):     Active Hospital Problems    Diagnosis   • Abdominal pain [R10.9]       Assessment  1. Obstructive jaundice from choledocholithiasis  2. Cholelithiasis  3. Elevated LFT  4. RUQ postprandial abd pain  5. Low MCV    Plan  1. NPO  2. IVF  3. Continue antibiotics for now  4. ERCP today with Dr. Basim Almanza, with likely sphincterotomy and other intervention  5. Check iron profile, viral hepatitis markers (pending results)  6. Pending surgical consult    Risks, benefits, and alternatives were discussed with patient. Consenting persons were given an opportunity to ask questions and discuss other options. Risks including but not limited to failed or incomplete endoscopy, ineffective therapy, perforation, infection, bleeding, missed lesion(s), cardiac and/or pulmonary event, aspiration, stroke, possible need for surgery, hospitalization possibly prolonged, discomfort, unsuccessful and/or incomplete procedure, possible need for repeat procedures and/or additional testings, damage to adjacent organs and/or vascular structures, medication reaction, disability, death, and other adverse events possibly life-threatening. Discussion was undertaken with Layman's terms. Consenting persons stated understanding and acceptance of these risks, and wished to proceed. Consent was given in clear state of mind. All questions were answered.    Thank you very much for allowing me to participate in the care of your patient.  Please feel free to  contact me anytime at [phone].     Derek Coreas M.D.

## 2017-03-06 NOTE — PROCEDURES
DATE OF SERVICES:  03/06/2017    TIME:  12:21 p.m.    ENDOSCOPIC PROCEDURES PERFORMED:  1.  Endoscopic retrograde cholangiopancreatography with biliary   sphincterotomy.  2.  Endoscopic retrograde cholangiopancreatography with balloon common biliary   ductal stones extraction, clearance complete.  3.  Cholangiogram radiological interpretation.    PREOPERATIVE DIAGNOSES OR INDICATIONS:  Obstructive jaundice, abnormal biliary   duct imaging, epigastric abdominal pain, suspected choledocholithiasis.    POSTOPERATIVE DIAGNOSES OR FINDINGS:  1.  Choledocholithiasis with obstruction, but without cholangitis, extracted.  2.  Biliary sphincterotomy performed.  3.  Cholelithiasis, awaiting laparoscopic cholecystectomy.    ENDOSCOPIST:  Basim Almanza MD, [facility]    PREMEDICATIONS:  Ceftriaxone 2 g IV.    ANESTHESIA:  General anesthesia.    ANESTHESIOLOGIST:  Suma Medina MD    CONSENT:  Risks, benefits, and alternatives were discussed with the patient.    She was given opportunity to ask questions and discuss other options, risks   including but not limited to perforation, infection, bleeding, missed lesions,   possible need for surgery, cardiopulmonary event, aspiration, stroke,   hospitalization, possibly prolonged discomfort, possibly repeat procedures and   additional testing, pancreatitis, failed or incomplete ERCP, retained   choledocholithiasis and other potential life-threatening complications.    Discussion was undertaken in Layman's terms.  The patient stated clear   understanding and acceptance of risks and wished to proceed with procedures as   detailed in this note.  Consent was given by the patient in clear state of   mind.    PROCEDURES IN DETAIL:  Side viewing therapeutic duodenoscope was inserted from   mouth to second portion of the duodenum.  Biliary duct was selectively   cannulated on the first attempt with a standard papillotome.  Successful free   cannulation was achieved.  Cholangiogram was  completed, then a biliary   sphincterotomy was performed with a bow-papillotome over a coated guidewire to   facilitate balloon common biliary ductal stones extraction.  Clearance was   complete with removal of more than 15 common biliary duct stones.  Completion   cholangiogram revealed no evidence of any retained choledocholithiasis.    Pancreatic duct was intentionally not cannulated nor injected.  Of note, no   radiologist was present to help obtain or interpret static or dynamic   cholangiogram images.  I was the sole physician who independently obtained and   interpreted static and dynamic cholangiogram images.  No overread was   requested from the radiologist nor was it necessary.  There was suction   insufflated air and stomach fluid contents upon removal.  The injected   contrast was seen freely flowing and drained from the biliary system upon   completion of procedure, suggesting adequate drainage.    PROCEDURE TIMES:  Preoperative assessment was 11:30 a.m.  Patient room in-time   was at 11:46 a.m.  Procedure start time was 11:59 a.m.  Procedure completion   time was at 12:07 p.m.  Patient out of room time was at 12:21 p.m.    ENDOSCOPIC RETROGRADE CHOLANGIOPANCREATOGRAPHY FINDINGS:  1.  Ampulla of Vater:  Located in the second portion of the duodenum, appeared   edematous consistent with known choledocholithiasis, otherwise endoscopically   unremarkable.  2.  Cholangiogram:  Multiple choledocholithiasis with upstream ductal   dilatation to greater than 1 cm.  No evidence of primary sclerosing   cholangitis nor biliary strictures.  Cystic duct anatomy, normal nonaberrant.  3.  Pancreatogram:  Pancreatic duct was intentionally not cannulated nor   injected.  4.  Therapy:  Biliary sphincterotomy performed with a bow-papillotome with   subsequent balloon common biliary ductal stones extraction, clearance   complete.  Removal of more than 15 yellowish common biliary duct stones.  They   ranged in size from 2  mm up to 1 cm in size, the larger ones were   cobblestoned and nodular consistent with chronic obstruction, suggestive of   chronic obstruction.  They were all yellowish and did not have appearance   suggestive of pigmented stones nor hemolysis.    IMPRESSION:  1.  Choledocholithiasis with obstruction, extracted.  2.  Biliary sphincterotomy performed.  3.  Normal cystic duct anatomy nonaberrant.    RECOMMENDATIONS:  1.  Return to inpatient room at [facility] under the care of Dr. Brooke and [facility].  2.  I personally contacted Dr. Brianna Norris with ERCP findings as she has   anticipated a laparoscopic cholecystectomy later this afternoon.  3.  Keep n.p.o. for surgery.  4.  Okay to advance diet as tolerated after surgery.       ____________________________________     MD LENORE WOMACK / NTS    DD:  03/06/2017 12:21:42  DT:  03/06/2017 12:46:47    D#:  [id number]  Job#:  [id number]    cc: NING RODRIGUEZ DO, Brianna Norris MD, Herrera Sands MD

## 2017-03-07 VITALS
SYSTOLIC BLOOD PRESSURE: 121 MMHG | DIASTOLIC BLOOD PRESSURE: 62 MMHG | TEMPERATURE: 98 F | HEART RATE: 58 BPM | OXYGEN SATURATION: 98 % | HEIGHT: 66 IN | WEIGHT: 265.21 LBS | RESPIRATION RATE: 19 BRPM | BODY MASS INDEX: 42.62 KG/M2

## 2017-03-07 LAB
ANION GAP SERPL CALC-SCNC: 6 MMOL/L (ref 0–11.9)
BUN SERPL-MCNC: <5 MG/DL (ref 8–22)
CALCIUM SERPL-MCNC: 8.3 MG/DL (ref 8.4–10.2)
CHLORIDE SERPL-SCNC: 104 MMOL/L (ref 96–112)
CO2 SERPL-SCNC: 25 MMOL/L (ref 20–33)
CREAT SERPL-MCNC: 0.79 MG/DL (ref 0.5–1.4)
ERYTHROCYTE [DISTWIDTH] IN BLOOD BY AUTOMATED COUNT: 40.7 FL (ref 35.9–50)
GFR SERPL CREATININE-BSD FRML MDRD: >60 ML/MIN/1.73 M 2
GLUCOSE SERPL-MCNC: 103 MG/DL (ref 65–99)
HAV AB SER QL IA: POSITIVE
HCT VFR BLD AUTO: 35.7 % (ref 37–47)
HGB BLD-MCNC: 11.6 G/DL (ref 12–16)
MCH RBC QN AUTO: 25.3 PG (ref 27–33)
MCHC RBC AUTO-ENTMCNC: 32.5 G/DL (ref 33.6–35)
MCV RBC AUTO: 77.8 FL (ref 81.4–97.8)
PLATELET # BLD AUTO: 437 K/UL (ref 164–446)
PMV BLD AUTO: 9.5 FL (ref 9–12.9)
POTASSIUM SERPL-SCNC: 3.6 MMOL/L (ref 3.6–5.5)
RBC # BLD AUTO: 4.59 M/UL (ref 4.2–5.4)
SODIUM SERPL-SCNC: 135 MMOL/L (ref 135–145)
WBC # BLD AUTO: 5.9 K/UL (ref 4.8–10.8)

## 2017-03-07 PROCEDURE — G0378 HOSPITAL OBSERVATION PER HR: HCPCS

## 2017-03-07 PROCEDURE — 99217 PR OBSERVATION CARE DISCHARGE: CPT | Performed by: FAMILY MEDICINE

## 2017-03-07 PROCEDURE — 700101 HCHG RX REV CODE 250: Performed by: HOSPITALIST

## 2017-03-07 PROCEDURE — 80048 BASIC METABOLIC PNL TOTAL CA: CPT

## 2017-03-07 PROCEDURE — 700102 HCHG RX REV CODE 250 W/ 637 OVERRIDE(OP): Performed by: HOSPITALIST

## 2017-03-07 PROCEDURE — A9270 NON-COVERED ITEM OR SERVICE: HCPCS | Performed by: HOSPITALIST

## 2017-03-07 PROCEDURE — 36415 COLL VENOUS BLD VENIPUNCTURE: CPT

## 2017-03-07 PROCEDURE — 85027 COMPLETE CBC AUTOMATED: CPT

## 2017-03-07 RX ORDER — ACETAMINOPHEN 325 MG/1
650 TABLET ORAL EVERY 4 HOURS PRN
Qty: 10 TAB | Refills: 0
Start: 2017-03-07 | End: 2017-12-28

## 2017-03-07 RX ADMIN — OMEPRAZOLE 20 MG: 20 CAPSULE, DELAYED RELEASE ORAL at 10:21

## 2017-03-07 RX ADMIN — SODIUM CHLORIDE AND POTASSIUM CHLORIDE: 9; 1.49 INJECTION, SOLUTION INTRAVENOUS at 03:29

## 2017-03-07 RX ADMIN — ACETAMINOPHEN 650 MG: 325 TABLET, FILM COATED ORAL at 06:58

## 2017-03-07 RX ADMIN — LEVOTHYROXINE SODIUM 200 MCG: 100 TABLET ORAL at 06:49

## 2017-03-07 ASSESSMENT — ENCOUNTER SYMPTOMS
FLANK PAIN: 0
PSYCHIATRIC NEGATIVE: 1
NAUSEA: 0
RESPIRATORY NEGATIVE: 1
CARDIOVASCULAR NEGATIVE: 1
MUSCULOSKELETAL NEGATIVE: 1
ABDOMINAL PAIN: 1
NEUROLOGICAL NEGATIVE: 1
CONSTITUTIONAL NEGATIVE: 1
VOMITING: 0

## 2017-03-07 ASSESSMENT — PAIN SCALES - GENERAL: PAINLEVEL_OUTOF10: 1

## 2017-03-07 NOTE — CARE PLAN
Problem: Infection  Goal: Will remain free from infection  Intervention: Implement standard precautions and perform hand washing before and after patient contact  Hand washing every encounter. IV ports scrubbed with alcohol when hanging medicine. Patient watch for s/s of infection. Patient taucht to report s/s of infection, verbalizes understanding.      Problem: Venous Thromboembolism (VTW)/Deep Vein Thrombosis (DVT) Prevention:  Goal: Patient will participate in Venous Thrombosis (VTE)/Deep Vein Thrombosis (DVT)Prevention Measures  Outcome: PROGRESSING AS EXPECTED  Intervention: Encourage patient to perform ankle flex, foot rotation, and knee flex exercises in addition to other prophylatic measures every hour while awake  SCDs in place.  Encourage to perform flexion of the feet while in bed and awake, verbalize understanding.  Encourage ambulation TID.

## 2017-03-07 NOTE — PROGRESS NOTES
Surg Prog    POD#1 lap paulino  Doing well, no complaints  Tolerating diet  Abdomen soft, incision c/d/i    Doing well  D/c home  F/u with me in one week    Brianna Norris M.D.  [facility]  [phone]

## 2017-03-07 NOTE — PROGRESS NOTES
Surgery went well, anticipate d/c in AM. D/C instructions as below.    Laparoscopic Cholecystectomy D/C instructions:    1. DIET: Upon discharge from the hospital you may resume your normal preoperative diet. Depending on how you are feeling and whether you have nausea or not, you may wish to stay with a bland diet for the first few days. However, you can advance this as quickly as you feel ready.    2. ACTIVITIES: After discharge from the hospital, you may resume full routine activities. However, there should be no heavy lifting (greater than 15 pounds) and no strenuous activities until after your follow-up visit. Otherwise, routine activities of daily living are acceptable.    3. DRIVING: You may drive whenever you are off pain medications and are able to perform the activities needed to drive, i.e. turning, bending, twisting, etc.    4. BATHING: You may get the wound wet at any time after leaving the hospital. You may shower, but do not submerge in a bath for at least a week. Dressings may come off after 48 hours.    5. BOWEL FUNCTION: A few patients, after this operation, will develop either frequent or loose stools after meals. This usually corrects itself after a few days, to a few weeks. If this occurs, do not worry; it is not unusual and will resolve. Much more common than loose stools, is constipation. The combination of pain medication and decreased activity level can cause constipation in otherwise normal patients. If you feel this is occurring, take a laxative (Milk of Magnesia, Ex-Lax, Senokot, etc.) until the problem has resolved.    6. PAIN MEDICATION: You will be given a prescription for pain medication at discharge. Please take these as directed. It is important to remember not to take medications on an empty stomach as this may cause nausea.    7.CALL IF YOU HAVE: (1) Fevers to more than 1010 F, (2) Unusual chest or leg pain, (3) Drainage or fluid from incision that may be foul smelling, increased  tenderness or soreness at the wound or the wound edges are no longer together, redness or swelling at the incision site. Please do not hesitate to call with any other questions.     8. APPOINTMENT: Contact our office at [phone] for a follow-up appointment in 1 to 2 weeks following your procedure.    If you have any additional questions, please do not hesitate to call the office and speak to either myself or the physician on call.    Office address:  [address] [place] [address]    Brianna Norris M.D.  [facility]  [phone]

## 2017-03-07 NOTE — OP REPORT
Operative Report    Date: 3/6/2017    Surgeon: Brianna Norris M.D.    Assistant: Jeanne BLANCA    Anesthesiologist: Michelle DOWNING    Preoperative Diagnosis: K80.0 Calculus of gallbladder with acute cholecystitis    Postoperative Diagnosis: K80.0 Calculus of gallbladder with acute cholecystitis    Procedure Performed: Laparoscopic cholecystectomy     Indications: This is a 36 y.o. female who presented with abdominal pain, nausea and vomiting. History, physical and diagnostic studies are consistent with acute cholecystitis. See my H&P for details.     An extensive procedures, alternative, risks and questions conference was held with the patient and her , in regard to the surgical treatment of cholelithasis and cholecystitis. The patient was counseled regarding the benefits of the operation, namely, removal of gallbladder and alleviation of her infection and symptoms, and prevention of further episodes. The patient was made aware of the alternatives, including operative and non-operative management. The risks of bleeding, infection, damage to surrounding structures, need for reoperation, need for open operation, bile duct injury, stroke, MI, and death were discussed with the patient. The patient was given a chance to ask questions, and all her questions were answered. Discussion was undertaken with Layman's terms. The patient and family demonstrated adequate understanding, seemed pleased with the plan, and wish to proceed. Consent was given in clear state of mind.  Consent was signed.     Findings: acute cholecystitis with stones.    Procedure in detail: The patient was brought to the operating room and was placed in the supine position where general endotracheal anesthesia was induced. SCDs were in place and functioning. Preoperative antibiotics of ancef were given before incision time. The patient's abdomen was prepped and draped in the usual sterile fashion.    After infiltration of local anesthetic, a  skin incision was made to accommodate a 5 mm port infra-umbilically. We then used the Veress needle to access the peritoneum, and after saline drop test, we insufflated to 15 mmHg. We then placed the 5mm 30 degree camera and inspected the abdomen for evidence of trauma secondary to Veress needle or port placement, and found none.    Utilizing the 30-degree laparoscope with the patient in the reverse Trendelenburg position, and after infiltration of local anesthetic, an additional 11-mm port was inserted into the epigastrium just to the right of the midline. Then two 5-mm ports were inserted in the midclavicular and anterior axillary lines, in the right upper quadrant, all under direct visualization.    The gallbladder was identified, it appeared slightly inflamed. The gallbladder was retracted cephalad and caudally using gallbladder graspers. Using the [place], we were able to peel the overlying peritoneum off the cystic duct, and circumferentially dissect it. We used electrocautery to futher remove the peritoneum off the gallbladder. We then were able to further dissect Calot's triangle until we identified the cystic artery, which was circumferrentially dissected.     We then doubly clipped the cystic duct distally and divided it. We then doubly clipped the cystic artery  and divided it.Then, using electrocautery, we removed the gallbladder from the liver bed. After ensuring gallbladder bed hemostasis with cautery, we removed the gallbladder and placed it in an endocatch bag, and removed it from the epigastric port site.    The right upper quadrant was irrigated copiously with normal saline solution. We inspected the cystic duct and artery stumps, and found them to be intact. The liver bed was hemostatic.    The trocars were removed under direct visualization.    The fascia on the all port sites >10 mm were closed with Vicryl sutures. The skin of all incisions was closed with running subcuticular suture.    All  sponge, needle, and instrument counts were reported as correct at the end of the procedure. The patient tolerated the procedure well and left the operating room for the recovery room in stable and satisfactory condition.    Estimated Blood Loss: minimal     Specimens: gallbladder for permanent pathology    Complications: none apparent     Drains: none     Disposition: stable, extubated, to PACU    Brianna Norris M.D.  [facility]  [phone]

## 2017-03-07 NOTE — DISCHARGE INSTRUCTIONS
Discharge Instructions    Discharged to home by car with relative. Discharged via wheelchair, hospital escort: Yes.  Special equipment needed: Not Applicable    Be sure to schedule a follow-up appointment with your primary care doctor or any specialists as instructed.     Discharge Plan:   Diet Plan: Discussed  Activity Level: Discussed  Confirmed Follow up Appointment: Patient to Call and Schedule Appointment  Confirmed Symptoms Management: Discussed  Medication Reconciliation Updated: Yes  Influenza Vaccine Indication: Not indicated: Previously immunized this influenza season and > 8 years of age    I understand that a diet low in cholesterol, fat, and sodium is recommended for good health. Unless I have been given specific instructions below for another diet, I accept this instruction as my diet prescription.   Other diet: Regular as tolerated    Special Instructions:     · Is patient discharged on Warfarin / Coumadin?   No     · Is patient Post Blood Transfusion?  No    Depression / Suicide Risk    As you are discharged from this [facility] facility, it is important to learn how to keep safe from harming yourself.    Recognize the warning signs:  · Abrupt changes in personality, positive or negative- including increase in energy   · Giving away possessions  · Change in eating patterns- significant weight changes-  positive or negative  · Change in sleeping patterns- unable to sleep or sleeping all the time   · Unwillingness or inability to communicate  · Depression  · Unusual sadness, discouragement and loneliness  · Talk of wanting to die  · Neglect of personal appearance   · Rebelliousness- reckless behavior  · Withdrawal from people/activities they love  · Confusion- inability to concentrate     If you or a loved one observes any of these behaviors or has concerns about self-harm, here's what you can do:  · Talk about it- your feelings and reasons for harming yourself  · Remove any means that you might use  to hurt yourself (examples: pills, rope, extension cords, firearm)  · Get professional help from the community (Mental Health, Substance Abuse, psychological counseling)  · Do not be alone:Call your Safe Contact- someone whom you trust who will be there for you.  · Call your local CRISIS HOTLINE 767-7315 or 420-416-1484  · Call your local Children's Mobile Crisis Response Team [place] (509) 351-3299 or www.[employer]  · Call the toll free National Suicide Prevention Hotlines   · National Suicide Prevention Lifeline 724-304-PGXF (1267)  · National Hope Line Network 800-SUICIDE (400-6211)

## 2017-03-07 NOTE — OR NURSING
1701 received from or  resp spont  abd soft with 4 2x2 dressings c/d/i  No c/o pain at this time  1730 report to Ce GANNON

## 2017-03-07 NOTE — DISCHARGE SUMMARY
HISTORY OF PRESENT ILLNESS:  This is a 36-year-old female who had come to the   emergency room with a complaint of abdominal pain.  Patient stated the   abdominal pain was sharp in nature.  It started Thursday, mostly epigastric   region.  It radiates to the right and left upper quadrant, 10/10 in intensity   at the time of presentation.  The patient was diagnosed with   choledocholithiasis and cholelithiasis and question of cholecystitis.  The   patient was admitted to the hospital for further management.    HOSPITAL PRESENTATION:  During the hospital stay, the patient was placed on IV   fluid and also on pain medication.  GI was consulted and recommendation was   to do an ERCP on the patient.  The patient was seen by Dr. Basim Almanza and   hopefully an ERCP of the common bile duct with stone extraction was made with   probably a sphincterotomy.  Recommendation for general surgery was also done.    The patient was seen by Dr. Norris from general surgery.  Cholecystectomy was   done as well.  The patient is seen at the bedside today.  She is doing much   better.  She will be discharged home.    PHYSICAL EXAMINATION:  VITAL SIGNS:  Temperature of 36.7, pulse of 58, respiratory rate of 19, blood   pressure 121/62, O2 saturation 98% on room air.  GENERAL APPEARANCE:  The patient is awake, alert, oriented x3, in no acute   distress.  NEUROLOGIC:  Cranial nerves II-XII intact.  NECK:  Supple.  No jugular venous distention noted.  HEENT:  Oral cavity is moist.  CARDIOVASCULAR:  S1 and S2, regular.  LUNGS:  Clear bilaterally.  No wheezing or crackles noted.  ABDOMEN:  Obese, soft, and nontender.  No rebound or guarding noted.  EXTREMITIES:  Lower extremities, no edema or rash noted.    LABORATORY DATA:  As of today, WBC of 5.9, H and H 11.6 and 35.7, platelets of   437,000.  Sodium 135, potassium 3.6, chloride 104, bicarb of 25, BUN of less   than 5, creatinine 0.79, and lipase was 17 on 03/05/2017.  Also, the ERCP was    done.  Hopefully, an ultrasound was also done on 03/05/2017 that showed there   was choledocholithiasis with extrahepatic biliary dilatation.    ASSESSMENT AND PLAN:  This is a 36-year-old female with   cholelithiasis/choledocholithiasis and dehydration.  1.  Status post endoscopic retrograde cholangiopancreatography.  2.  Status post cholecystectomy.  3.  Status IV hydration.    The patient did not have any elevated white blood cell, no antibiotic was   given.    For pain, the patient will have Tylenol 650 p.o. every 4 hours p.r.n. pain.    The patient is to follow up with Dr. Norris of general surgery in 1-2 weeks   and instructions are given to the patient from general surgery for   postoperative period.       ____________________________________     MD DULCE Phelps / QUIQUE    DD:  03/07/2017 09:24:42  DT:  03/07/2017 09:49:02    D#:  [id number]  Job#:  [id number]

## 2017-03-07 NOTE — PROGRESS NOTES
1845- report received from TERESSA Encinas.     1850- Pt transferred back to unit via hospital bed. Assumed care of pt. Pt sitting up in bed. Pt A&Ox4. POC discussed. Pt verbalized understanding. No signs of distress or discomfort noted at this time. Pt instructed to use call light for assistance. Call light and personal belongings within reach. Pt denies any concerns at this time. All questions answered. Safety measures in place. VSS. Lap stabs x4, CDI.     1900- Report given to NOC RN

## 2017-03-07 NOTE — OR NURSING
1730 - Patient resting with eyes closed to easily awakened and cooperative then back to resting with eyes closed. X4 adominal stab wounds with gauze and clear tape dressings all clean, dry, and intact. Abdomen soft. Pain 4/10 and tolerable. . Denies nausea. VS as noted.     1745 - Remains as above. In 4/10 and trolerable. Denies nausea. VS as noted.     1800 - Resting with eyes closed to easily awakened and cooperative then back to resting with eyes closed. Pain now 6/10 - medicated with Fentanyl and Hydrocodone as noted on MAR. Denies nausea - tolerating sips of water. VS as noted.     1815 - Pain now 3/10 and tolerable. Denies nausea. VS as noted.     1830 - Patient awake and cooperative. Pain 3/10 and very tolerable. Denies nausea - tolerating sips of water. Dressings remain clean, dry, and intact. Abdomen soft. VS as noted. Meets criteria to transfer to room. Report called to TERESSA Arceo. Awaiting assistance for transfer.     1845 - Transferred via bed to room on O2 via NC at 2 lpm by RN and transport.

## 2017-03-07 NOTE — PROGRESS NOTES
Gastroenterology (GIC) Progress Note     Author: Basim ASENCIO Cami   Date & Time Created: 3/7/2017 8:03 AM    Interval History:  CC: choledocholithiasis with obstruction, acute cholecystitis    Patient's prior severe epigastric and RUQ pain resolved after ERCP & lap paulino.  Her nausea, pruritus and dark urine also resolved.  She is sore after lap paulino as expected but is feeling much better.  Denied further modifying factors, associated symptoms or timing issues.    HPI: 36 y.o. morbidly obese female presented with colicky intermittent sharp postprandial pains since Thursday 3/2/17 in the epigastric region radiating into the right upper quadrant, 10/10 in intensity, associated with nausea and vomiting. She never had this kind of pain before.  She also noted dark urine, but denied fevers and chills.  CT showed cholelithiasis and choledocholithiasis.    Review of Systems:  Review of Systems   Constitutional: Negative.    HENT: Negative.    Respiratory: Negative.    Cardiovascular: Negative.    Gastrointestinal: Positive for abdominal pain. Negative for nausea, vomiting and melena.   Genitourinary: Negative for flank pain.   Musculoskeletal: Negative.    Skin: Negative.    Neurological: Negative.    Psychiatric/Behavioral: Negative.    All other systems reviewed and are negative.      Physical Exam:  Physical Exam   Constitutional: She is oriented to person, place, and time. She appears well-developed and well-nourished. No distress.   HENT:   Head: Normocephalic and atraumatic.   Mouth/Throat: No oropharyngeal exudate.   Eyes: EOM are normal. Pupils are equal, round, and reactive to light. No scleral icterus.   Neck: Normal range of motion. Neck supple. No JVD present. No tracheal deviation present.   Cardiovascular: Normal rate, regular rhythm and intact distal pulses.    No murmur heard.  Pulmonary/Chest: Effort normal and breath sounds normal. No stridor. No respiratory distress. She has no wheezes.   Abdominal:  Soft. Bowel sounds are normal. She exhibits no distension. There is tenderness. There is no rebound and no guarding.   Lap paulino incision sites without bruising or drainage.  Tenderness at sites not beyond expected.   Musculoskeletal: Normal range of motion. She exhibits no edema or tenderness.   Neurological: She is alert and oriented to person, place, and time. No cranial nerve deficit. Coordination normal.   Skin: Skin is warm and dry. No rash noted. She is not diaphoretic. No erythema. No pallor.   Psychiatric: She has a normal mood and affect. Her behavior is normal. Judgment and thought content normal.   Nursing note and vitals reviewed.      Labs:        Invalid input(s): [id number]      Recent Labs      03/05/17 2000 03/06/17 0640 03/07/17 0452   SODIUM  134*  136  135   POTASSIUM  3.4*  3.4*  3.6   CHLORIDE  99  103  104   CO2  25  25  25   BUN  6*  <5*  <5*   CREATININE  0.75  0.80  0.79   CALCIUM  9.2  8.6  8.3*     Recent Labs      03/05/17 2000 03/06/17 0640 03/07/17 0452   ALTSGPT  528*   --    --    ASTSGOT  254*   --    --    ALKPHOSPHAT  323*   --    --    TBILIRUBIN  4.4*   --    --    LIPASE  17   --    --    GLUCOSE  100*  98  103*     Recent Labs      03/05/17 2000 03/05/17 2200 03/06/17 0640 03/07/17 0452   RBC  5.12   --   4.54  4.59   HEMOGLOBIN  12.8   --   11.5*  11.6*   HEMATOCRIT  39.4   --   36.2*  35.7*   PLATELETCT  474*   --   383  437   PROTHROMBTM   --   13.1   --    --    INR   --   1.01   --    --    IRON   --   55   --    --    FERRITIN   --   38.2   --    --    TOTIRONBC   --   486*   --    --      Recent Labs      03/05/17 2000 03/06/17 0640 03/07/17 0452   WBC  5.4  3.3*  5.9   NEUTSPOLYS  58.90   --    --    LYMPHOCYTES  29.30   --    --    MONOCYTES  8.50   --    --    EOSINOPHILS  1.30   --    --    BASOPHILS  1.80   --    --    ASTSGOT  254*   --    --    ALTSGPT  528*   --    --    ALKPHOSPHAT  323*   --    --    TBILIRUBIN  4.4*    --    --      Hemodynamics:  Temp (24hrs), Av.6 °C (97.9 °F), Min:36.4 °C (97.6 °F), Max:36.8 °C (98.2 °F)  Temperature: 36.7 °C (98 °F)  Pulse  Av.7  Min: 58  Max: 94Heart Rate (Monitored): 88  Blood Pressure: 121/62 mmHg     Respiratory:    Respiration: 19, Pulse Oximetry: 98 %           Fluids:    Intake/Output Summary (Last 24 hours) at 17 0803  Last data filed at 17 0501   Gross per 24 hour   Intake   1575 ml   Output   1200 ml   Net    375 ml        GI/Nutrition:  Orders Placed This Encounter   Procedures   • DIET NPO     Standing Status: Standing      Number of Occurrences: 1      Standing Expiration Date:      Order Specific Question:  Restrict to:     Answer:  Strict [1]      Comments:  for lap paulino     Medical Decision Making, by Problem:  Active Hospital Problems    Diagnosis   • *Cholelithiasis and cholecystitis with obstruction [K80.19]   • Hypothyroid [E03.9]     Problems:  1. Choledocholithiasis with obstruction without cholangitis  2. Acute cholecystitis with cholelithiasis  3. Epigastric and RUQ pain, resolved  4. Nausea and vomiting, resolved  5. Microcytosis with negative iron studies  6. Morbid obesity  7. Hypothyroidism  8. History of D+C  9. Ex-smoker    Recommendations:  1. Advance diet per surgeon  2. I reviewed labs and noted to have negative iron studies, AFP and infectious hepatitis panel  3. After surgical recovery, recommend low-fat diet and exercise for weight reduction.  Patient would be a good candidate for bariatric surgery if she is unable to lose weight, but I will defer to Dr. Brianna Norris to reevaluate as an outpatient.  4. [facility] signed off  5. No scheduled GI follow-up needed, just PRN.    Labs reviewed and Medications reviewed

## 2017-03-08 NOTE — CARE PLAN
Problem: Discharge Barriers/Planning  Goal: Patient’s continuum of care needs will be met  Intervention: Explain discharge instructions and medication reconcilliation to patient and significant other/support system  1300 Seen by Dr Norris  Verbalizes understanding of discharge instruction meets all criteria for discharge  Discharged to  to home.

## 2017-04-15 NOTE — H&P
DATE OF ADMISSION:  03/05/2017    CHIEF COMPLAINT:  Abdominal pain.    PRIMARY CARE PHYSICIAN:  Dr. Greenfield.    ADMITTED TO:  [facility] hospitalist, Dr. Jernigan.    ON CONSULT:  GI, Dr. Yung.    ADMITTED TO:  [facility]""istDr. Jernigan.    DIAGNOSIS:  Acute cholecystitis with common bile duct stone.    HISTORY OF CURRENT ILLNESS:  The patient is a 36-year-old female who has been   having sharp pains since Thursday in the epigastric region radiating into the   right and left upper quadrants, 10/10 in intensity.  It is intermittent, when   it comes on it is 10/10.  It is usually after she eats that it comes on, there   is accompanying nausea, vomiting and headache.  No fevers or chills.  No   chest pain, no shortness of breath.  The patient at this point has had an   ultrasound.  The ultrasound does show at this point, multiple gallstones   including a retained gallstone in the common bile duct and the patient's liver   functions at this point have elevated.  The patient at this point will need a   MRCP and then she will need an ERCP in the morning and the patient after the   ERCP will need surgical consultation and removal of her gallbladder.    PAST MEDICAL HISTORY:  In the patient includes hypothyroidism.    PAST SURGICAL HISTORY:  She had a D and C 2 years ago.    ALLERGIES:  No known allergies.    MEDICATIONS:  At the time of admission include Flonase 1 spray in the nose   twice daily, Synthroid 200 mcg p.o. daily, norethindrone 1 tablet p.o. daily,   prenatal vitamins 1 tablet p.o. daily.    SOCIAL HISTORY:  She is a former smoker.  She smoked for 20 years, half pack a   day.  She quit in 2012.  She occasionally drinks alcohol, does not use any   recreational drugs.  She has an advanced directive.  She wishes to be DNR code   status.    FAMILY HISTORY:  Mother has diabetes, hypertension.  Father is healthy.    REVIEW OF SYSTEMS:  She complains of nausea, complains of abdominal pain,   complains of vomiting,  complains of 10/10, epigastric, sharp pain radiating to   the left and upper right quadrants, complains of a headache.  Denies fevers   or chills.  Denies at this point, chest pain, shortness of breath.  All other   review of systems were reviewed and are negative.    PHYSICAL EXAMINATION:  VITAL SIGNS:  Temperature 37.2 degrees Celsius, pulse 70, respirations are 16,   blood pressure 113/78, she is 120 kilograms in weight, 167 cm tall.  GENERAL:  She is currently alert, awake, oriented x3, pleasant, cooperative   female appears her stated age.  HEENT:  Head is atraumatic.  Eyes follow in normal range of gaze.  Pupils are   equal, round, reactive bilaterally.  Nose is midline without discharge.  Ears   bilaterally intact without discharge.  Oral cavity, moist mucous membranes.    No apparent focus infection in the oral cavity.  NECK:  Soft and supple.  No JVD, carotid bruit, thyromegaly, or   lymphadenopathy appreciated.  CHEST:  Chest wall moves equally with inspiration and expiration.  No   paradoxical motion.  No reproducible chest wall tenderness.  HEART:  S1, S2.  No murmurs, gallops detected.  LUNGS:  Clear to auscultation.  No rales or rhonchi detected.  ABDOMEN:  Tender in the epigastric region and radiates to the right upper   quadrant, no hepatomegaly, no splenomegaly.  GENITAL:  Deferred.  RECTAL:  Deferred.  EXTREMITIES:  Upper and lower extremities, positive pulses, no edema, good   muscle strength, good muscle tone, positive deep tendon reflexes.  NEUROLOGIC:  Cranial nerves II-XII grossly within normal limits without any   focal deficits appreciated.  SKIN:  Normal turgor.  No rashes or abrasions identified.    LABORATORY EVALUATION:  WBC count 5.4, hemoglobin 12.8, hematocrit 39.4,   platelets are 474.  Her MCV is low at 77.  Sodium 134, potassium 3.4, chloride   99, CO2 25, BUN 6, creatinine 0.75, calcium 9.2 and a glucose 100.  Liver   functions are all elevated.  AST is 254, , alkaline  phosphatase 323,   total bilirubin 4.4, albumin is normal at 4.3 and total protein is up at 8.5.    INR is 1.  Ultrasound of the gallbladder at this point shows   choledocholithiasis with extrahepatic biliary dilation.  CT of the abdomen and   pelvis at this point shows cholelithiasis choledocholithiasis and no evidence   of biliary dilation, no evidence of urolithiasis or other cause of   obstructive uropathy.  MRI of the abdomen is pending.  A HIDA scan at this   point is not necessary as the patient has a common bile duct dilation.    IMPRESSION AND PLAN:  At this point:  1.  Acute cholecystitis with common bile duct stone.  GI has been consulted   and MRCP will be done and then ERCP will need to follow.  Once the patient has   had ERCP done, surgery then can remove her gallbladder.  In the meantime, the   patient will be given fluid resuscitation and she will be kept n.p.o.  She   will be given antibiotics with Rocephin and Flagyl.  Patient will continue at   this point with pain management using IV fentanyl 25-50 mcg every 1 hour.  2.  For her hypothyroidism, continue with Synthroid supplementation.  Check a   TSH level.  The patient will be given antiemetics.  The patient at this point   is observation placement to the surgical floor.       ____________________________________     MD AURELIO JOHNSON / QUIQUE    DD:  03/06/2017 00:30:59  DT:  03/06/2017 00:55:03    D#:  [id number]  Job#:  [id number]    cc: NING RODRIGUEZ DO

## 2017-12-28 ENCOUNTER — OFFICE VISIT (OUTPATIENT)
Dept: URGENT CARE | Facility: CLINIC | Age: 37
End: 2017-12-28
Payer: COMMERCIAL

## 2017-12-28 ENCOUNTER — APPOINTMENT (OUTPATIENT)
Dept: RADIOLOGY | Facility: IMAGING CENTER | Age: 37
End: 2017-12-28
Attending: NURSE PRACTITIONER
Payer: COMMERCIAL

## 2017-12-28 VITALS
DIASTOLIC BLOOD PRESSURE: 70 MMHG | BODY MASS INDEX: 43.82 KG/M2 | SYSTOLIC BLOOD PRESSURE: 126 MMHG | OXYGEN SATURATION: 99 % | TEMPERATURE: 97.7 F | HEART RATE: 68 BPM | HEIGHT: 65 IN | RESPIRATION RATE: 16 BRPM | WEIGHT: 263 LBS

## 2017-12-28 DIAGNOSIS — M79.674 TOE PAIN, RIGHT: ICD-10-CM

## 2017-12-28 PROCEDURE — 73630 X-RAY EXAM OF FOOT: CPT | Mod: TC,RT | Performed by: NURSE PRACTITIONER

## 2017-12-28 PROCEDURE — 99213 OFFICE O/P EST LOW 20 MIN: CPT | Performed by: NURSE PRACTITIONER

## 2017-12-28 ASSESSMENT — ENCOUNTER SYMPTOMS
SHORTNESS OF BREATH: 0
WEAKNESS: 0
DIZZINESS: 0
NUMBNESS: 0
HEADACHES: 0
FEVER: 0
MYALGIAS: 0
SORE THROAT: 0
VOMITING: 0
JOINT SWELLING: 0
NAUSEA: 0
EYE PAIN: 0
CHILLS: 0

## 2017-12-29 NOTE — PROGRESS NOTES
Subjective:     Maame Enrique is a 37 y.o. female who presents for Toe Injury (2nd toe on the right foot)       Toe Injury   This is a new problem. The current episode started yesterday. The problem occurs constantly. The problem has been unchanged. Pertinent negatives include no chest pain, chills, fever, headaches, joint swelling, myalgias, nausea, numbness, rash, sore throat, vomiting or weakness. The symptoms are aggravated by walking. She has tried NSAIDs for the symptoms. The treatment provided no relief.     Past Medical History:   Diagnosis Date   • Thyroid disease      Past Surgical History:   Procedure Laterality Date   • ERCP N/A 3/6/2017    Procedure: ERCP;  Surgeon: Basim Almanza M.D.;  Location: [facility];  Service:    • ERCP W/REMOVAL CALCULUS N/A 3/6/2017    Procedure: ERCP W/REMOVAL CALCULUS;  Surgeon: Basim Almanza M.D.;  Location: [facility];  Service:    • ERCP W/SPHINCTEROTOMY/PAPILL. N/A 3/6/2017    Procedure: ERCP W/SPHINCTEROTOMY/PAPILL.;  Surgeon: Basim Almanza M.D.;  Location: [facility];  Service:    • HERI BY LAPAROSCOPY  3/6/2017    Procedure: HERI BY LAPAROSCOPY;  Surgeon: Brianna Norris M.D.;  Location: [facility];  Service:      Social History     Social History   • Marital status:      Spouse name: N/A   • Number of children: N/A   • Years of education: N/A     Occupational History   • Not on file.     Social History Main Topics   • Smoking status: Former Smoker     Types: Cigarettes   • Smokeless tobacco: Not on file   • Alcohol use Yes      Comment: social   • Drug use: No   • Sexual activity: Yes     Partners: Male     Other Topics Concern   • Not on file     Social History Narrative   • No narrative on file    No family history on file. Review of Systems   Constitutional: Negative for chills and fever.   HENT: Negative for sore throat.    Eyes: Negative for pain.   Respiratory: Negative for  "shortness of breath.    Cardiovascular: Negative for chest pain.   Gastrointestinal: Negative for nausea and vomiting.   Genitourinary: Negative for hematuria.   Musculoskeletal: Positive for joint pain. Negative for joint swelling and myalgias.   Skin: Negative for rash.   Neurological: Negative for dizziness, weakness, numbness and headaches.   No Known Allergies   Objective:   /70   Pulse 68   Temp 36.5 °C (97.7 °F)   Resp 16   Ht 1.651 m (5' 5\")   Wt 119.3 kg (263 lb)   LMP 02/09/2017   SpO2 99%   BMI 43.77 kg/m²   Physical Exam   Constitutional: She is oriented to person, place, and time. She appears well-developed and well-nourished. No distress.   HENT:   Head: Normocephalic and atraumatic.   Eyes: Conjunctivae and EOM are normal. Pupils are equal, round, and reactive to light.   Cardiovascular: Normal rate and regular rhythm.    No murmur heard.  Pulmonary/Chest: Effort normal and breath sounds normal. No respiratory distress.   Abdominal: Soft. She exhibits no distension. There is no tenderness.   Musculoskeletal:        Right foot: There is decreased range of motion, tenderness and swelling.        Feet:    Neurological: She is alert and oriented to person, place, and time. She has normal reflexes. No sensory deficit.   Skin: Skin is warm and dry.   Psychiatric: She has a normal mood and affect.         Assessment/Plan:   Assessment    1. Toe pain, right  - DX-FOOT-COMPLETE 3+ RIGHT; Future  No acute fracture or dislocation.  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 Mild ws hallux valgus deformity.  Old right fifth metatarsal fracture.    Relative rest, ice, nsaid prn. Elevation  swelling. Resume activity as tolerated.    Patient given precautionary s/sx that mandate immediate follow up and evaluation in the ED. Advised of risks of not doing so.    DDX, Supportive care, and indications for immediate follow-up discussed with patient.    Instructed to return to clinic or nearest emergency " department if we are not available for any change in condition, further concerns, or worsening of symptoms.    The patient demonstrated a good understanding and agreed with the treatment plan.

## 2018-08-14 ENCOUNTER — OFFICE VISIT (OUTPATIENT)
Dept: URGENT CARE | Facility: CLINIC | Age: 38
End: 2018-08-14
Payer: COMMERCIAL

## 2018-08-14 ENCOUNTER — APPOINTMENT (OUTPATIENT)
Dept: RADIOLOGY | Facility: IMAGING CENTER | Age: 38
End: 2018-08-14
Attending: PHYSICIAN ASSISTANT
Payer: COMMERCIAL

## 2018-08-14 VITALS
HEIGHT: 65 IN | OXYGEN SATURATION: 100 % | HEART RATE: 81 BPM | DIASTOLIC BLOOD PRESSURE: 78 MMHG | TEMPERATURE: 97.5 F | WEIGHT: 270 LBS | SYSTOLIC BLOOD PRESSURE: 110 MMHG | BODY MASS INDEX: 44.98 KG/M2

## 2018-08-14 DIAGNOSIS — S99.921A INJURY OF TOE ON RIGHT FOOT, INITIAL ENCOUNTER: ICD-10-CM

## 2018-08-14 PROCEDURE — 73630 X-RAY EXAM OF FOOT: CPT | Mod: TC,FY,RT | Performed by: PHYSICIAN ASSISTANT

## 2018-08-14 PROCEDURE — 99214 OFFICE O/P EST MOD 30 MIN: CPT | Performed by: PHYSICIAN ASSISTANT

## 2018-08-14 ASSESSMENT — ENCOUNTER SYMPTOMS
SEIZURES: 0
SENSORY CHANGE: 0
SPEECH CHANGE: 0
DOUBLE VISION: 0
DIZZINESS: 0
LOSS OF CONSCIOUSNESS: 0
TREMORS: 0
FEVER: 0
CHILLS: 0
COUGH: 0
PALPITATIONS: 0
FOCAL WEAKNESS: 0
SHORTNESS OF BREATH: 0
BLURRED VISION: 0
HEADACHES: 0
TINGLING: 0

## 2018-08-14 ASSESSMENT — PATIENT HEALTH QUESTIONNAIRE - PHQ9: CLINICAL INTERPRETATION OF PHQ2 SCORE: 0

## 2018-08-14 NOTE — PROGRESS NOTES
Subjective:      Maame Enrique is a 38 y.o. female who presents with Toe Injury            Toe Injury   This is a new problem. The current episode started in the past 7 days (stubbed 3rd right toe on suitcase). The problem occurs constantly. Pertinent negatives include no chest pain, chills, coughing, fever, headaches or rash. The symptoms are aggravated by walking. She has tried NSAIDs (tape) for the symptoms. The treatment provided mild relief.       Review of Systems   Constitutional: Negative for chills and fever.   Eyes: Negative for blurred vision and double vision.   Respiratory: Negative for cough and shortness of breath.    Cardiovascular: Negative for chest pain and palpitations.   Musculoskeletal:        Right toe pain     Skin: Negative for rash.   Neurological: Negative for dizziness, tingling, tremors, sensory change, speech change, focal weakness, seizures, loss of consciousness and headaches.   All other systems reviewed and are negative.    PMH:  has a past medical history of Thyroid disease.  MEDS:   Current Outpatient Prescriptions:   •  vitamin D (CHOLECALCIFEROL) 1000 UNIT Tab, Take 1,000 Units by mouth every day., Disp: , Rfl:   •  Fluticasone Propionate (FLONASE NA), Spray  in nose., Disp: , Rfl:   •  LEVOTHYROXINE SODIUM, 200 mcg by Does not apply route every day., Disp: , Rfl:   •  Prenatal Vit-Fe Fumarate-FA (PRENATAL COMPLETE PO), Take  by mouth., Disp: , Rfl:   ALLERGIES: No Known Allergies  SURGHX:   Past Surgical History:   Procedure Laterality Date   • ERCP N/A 3/6/2017    Procedure: ERCP;  Surgeon: Basim Almanza M.D.;  Location: [facility];  Service:    • ERCP W/REMOVAL CALCULUS N/A 3/6/2017    Procedure: ERCP W/REMOVAL CALCULUS;  Surgeon: Basim Almanza M.D.;  Location: [facility];  Service:    • ERCP W/SPHINCTEROTOMY/PAPILL. N/A 3/6/2017    Procedure: ERCP W/SPHINCTEROTOMY/PAPILL.;  Surgeon: Basim Almanza M.D.;  Location: [facility]  [facility];  Service:    • HERI BY LAPAROSCOPY  3/6/2017    Procedure: HERI BY LAPAROSCOPY;  Surgeon: Brianna Norris M.D.;  Location: [facility];  Service:      SOCHX:  reports that she has quit smoking. Her smoking use included Cigarettes. She has never used smokeless tobacco. She reports that she drinks alcohol. She reports that she does not use drugs.  FH: Family history was reviewed, no pertinent findings to report  Medications, Allergies, and current problem list reviewed today in Epic       Objective:     /78   Pulse 81   Temp 36.4 °C (97.5 °F)   Ht 1.651 m (5' 5\")   Wt 122.5 kg (270 lb)   LMP 08/01/2018   SpO2 100%   Breastfeeding? No   BMI 44.93 kg/m²      Physical Exam   Constitutional: She is oriented to person, place, and time. She appears well-developed and well-nourished.  Non-toxic appearance. She does not have a sickly appearance. She does not appear ill. No distress.   HENT:   Head: Normocephalic and atraumatic.   Right Ear: External ear normal.   Left Ear: External ear normal.   Eyes: Conjunctivae and EOM are normal.   Neck: Normal range of motion. Neck supple.   Cardiovascular: Normal rate, regular rhythm, normal heart sounds, intact distal pulses and normal pulses.    Pulmonary/Chest: Effort normal and breath sounds normal.   Musculoskeletal: Normal range of motion. She exhibits tenderness. She exhibits no edema or deformity.   3rd right toe swelling.   Neurological: She is alert and oriented to person, place, and time. She has normal reflexes. She displays normal reflexes. She exhibits normal muscle tone. Coordination normal.   Skin: Skin is warm and dry. She is not diaphoretic.   Psychiatric: She has a normal mood and affect. Her behavior is normal. Judgment and thought content normal.   Vitals reviewed.              8/14/2018 8:47 AM    HISTORY/REASON FOR EXAM:  Pain/Deformity Following Trauma  Injury    TECHNIQUE/EXAM DESCRIPTION AND NUMBER OF VIEWS:  3 " nonweightbearing views of the RIGHT foot.    COMPARISON:  12/28/2017    FINDINGS:  There is old post traumatic deformity of the fifth metatarsal. There is hallux valgus deformity with joint space narrowing at the first MTP joint. No acute fracture or dislocation is seen. There is mild spurring at the proximal interphalangeal joint of   the fifth digit. There is interphalangeal joint space narrowing. There is spurring of the calcaneus at the insertion of the Achilles tendon.     Impression       No evidence of acute fracture or dislocation.    Old posttraumatic deformity of the fifth metatarsal.    Hallux valgus deformity.       Assessment/Plan:     1. Injury of toe on right foot, initial encounter  - RICE Therapy  - Alexis Tape  - DX-FOOT-COMPLETE 3+ RIGHT; Future    Differential diagnosis, natural history, supportive care discussed. Follow-up with primary care provider within 7-10 days, emergency room precautions discussed.  Patient and/or family appears understanding of information.  Handout and review of patients diagnosis and treatment was discussed extensively.

## 2019-05-29 ENCOUNTER — HOSPITAL ENCOUNTER (OUTPATIENT)
Dept: LAB | Facility: MEDICAL CENTER | Age: 39
End: 2019-05-29
Attending: OBSTETRICS & GYNECOLOGY
Payer: COMMERCIAL

## 2019-05-29 LAB — CYTOLOGY REG CYTOL: NORMAL

## 2019-05-29 PROCEDURE — 88175 CYTOPATH C/V AUTO FLUID REDO: CPT

## 2019-11-26 ENCOUNTER — OFFICE VISIT (OUTPATIENT)
Dept: URGENT CARE | Facility: CLINIC | Age: 39
End: 2019-11-26
Payer: COMMERCIAL

## 2019-11-26 VITALS
OXYGEN SATURATION: 97 % | BODY MASS INDEX: 44.76 KG/M2 | HEART RATE: 67 BPM | SYSTOLIC BLOOD PRESSURE: 130 MMHG | DIASTOLIC BLOOD PRESSURE: 76 MMHG | WEIGHT: 269 LBS | TEMPERATURE: 97.8 F | RESPIRATION RATE: 16 BRPM

## 2019-11-26 DIAGNOSIS — J01.00 ACUTE MAXILLARY SINUSITIS, RECURRENCE NOT SPECIFIED: ICD-10-CM

## 2019-11-26 DIAGNOSIS — H66.001 ACUTE SUPPURATIVE OTITIS MEDIA OF RIGHT EAR WITHOUT SPONTANEOUS RUPTURE OF TYMPANIC MEMBRANE, RECURRENCE NOT SPECIFIED: Primary | ICD-10-CM

## 2019-11-26 DIAGNOSIS — R05.9 COUGH: ICD-10-CM

## 2019-11-26 PROCEDURE — 99214 OFFICE O/P EST MOD 30 MIN: CPT | Performed by: PHYSICIAN ASSISTANT

## 2019-11-26 RX ORDER — LIOTHYRONINE SODIUM 25 UG/1
25 TABLET ORAL DAILY
Status: ON HOLD | COMMUNITY
End: 2021-04-08

## 2019-11-26 RX ORDER — AMOXICILLIN AND CLAVULANATE POTASSIUM 875; 125 MG/1; MG/1
1 TABLET, FILM COATED ORAL 2 TIMES DAILY
Qty: 20 TAB | Refills: 0 | Status: ON HOLD | OUTPATIENT
Start: 2019-11-26 | End: 2021-04-08

## 2019-11-26 RX ORDER — CODEINE PHOSPHATE/GUAIFENESIN 10-100MG/5
10 LIQUID (ML) ORAL 4 TIMES DAILY PRN
Qty: 200 ML | Refills: 0 | Status: SHIPPED | OUTPATIENT
Start: 2019-11-26 | End: 2019-12-03

## 2019-11-26 RX ORDER — INFLUENZA A VIRUS A/BRISBANE/02/2018 IVR-190 (H1N1) ANTIGEN (FORMALDEHYDE INACTIVATED), INFLUENZA A VIRUS A/KANSAS/14/2017 X-327 (H3N2) ANTIGEN (FORMALDEHYDE INACTIVATED), INFLUENZA B VIRUS B/PHUKET/3073/2013 ANTIGEN (FORMALDEHYDE INACTIVATED), AND INFLUENZA B VIRUS B/MARYLAND/15/2016 BX-69A ANTIGEN (FORMALDEHYDE INACTIVATED) 15; 15; 15; 15 UG/.5ML; UG/.5ML; UG/.5ML; UG/.5ML
INJECTION, SUSPENSION INTRAMUSCULAR
Refills: 0 | COMMUNITY
Start: 2019-10-18

## 2019-11-26 ASSESSMENT — ENCOUNTER SYMPTOMS
SHORTNESS OF BREATH: 0
HEADACHES: 1
SORE THROAT: 0
COUGH: 1
WHEEZING: 0
RHINORRHEA: 1
SINUS PAIN: 1
SPUTUM PRODUCTION: 1
SWEATS: 0
FEVER: 0

## 2019-11-26 NOTE — PROGRESS NOTES
Subjective:      Maame Enrique is a 39 y.o. female who presents with Cough (congestion, ear pain, sore throat x10 days )    PMH:  has a past medical history of Thyroid disease.  MEDS:   Current Outpatient Medications:   •  FLUZONE QUADRIVALENT 0.5 ML Suspension Prefilled Syringe injection, PHARMACIST ADMINISTERED IMMUNIZATION ADMINISTERED AT TIME OF DISPENSING, Disp: , Rfl: 0  •  liothyronine (CYTOMEL) 25 MCG Tab, Take 25 mcg by mouth every day., Disp: , Rfl:   •  vitamin D (CHOLECALCIFEROL) 1000 UNIT Tab, Take 1,000 Units by mouth every day., Disp: , Rfl:   •  Fluticasone Propionate (FLONASE NA), Spray  in nose., Disp: , Rfl:   •  LEVOTHYROXINE SODIUM, 200 mcg by Does not apply route every day., Disp: , Rfl:   •  Prenatal Vit-Fe Fumarate-FA (PRENATAL COMPLETE PO), Take  by mouth., Disp: , Rfl:   ALLERGIES: No Known Allergies  SURGHX:   Past Surgical History:   Procedure Laterality Date   • ERCP N/A 3/6/2017    Procedure: ERCP;  Surgeon: Basim Almanza M.D.;  Location: [facility];  Service:    • ERCP W/REMOVAL CALCULUS N/A 3/6/2017    Procedure: ERCP W/REMOVAL CALCULUS;  Surgeon: Basim Almanza M.D.;  Location: [facility];  Service:    • ERCP W/SPHINCTEROTOMY/PAPILL. N/A 3/6/2017    Procedure: ERCP W/SPHINCTEROTOMY/PAPILL.;  Surgeon: Basim Almanza M.D.;  Location: [facility];  Service:    • HERI BY LAPAROSCOPY  3/6/2017    Procedure: HERI BY LAPAROSCOPY;  Surgeon: Brianna Norris M.D.;  Location: [facility];  Service:      SOCHX:  reports that she has quit smoking. Her smoking use included cigarettes. She has never used smokeless tobacco. She reports current alcohol use. She reports that she does not use drugs.  FH: Reviewed with patient, not pertinent to this visit.           Patient presents with:  Cough: congestion, ear pain, sore throat x10 days         Cough   This is a new problem. The current episode started 1 to 4 weeks ago. The problem  has been gradually worsening. The cough is productive of sputum. Associated symptoms include ear congestion, ear pain (right), headaches, nasal congestion, postnasal drip and rhinorrhea. Pertinent negatives include no chest pain, fever, sore throat, shortness of breath, sweats or wheezing. The symptoms are aggravated by lying down and cold air. She has tried body position changes, OTC cough suppressant and rest for the symptoms. The treatment provided mild relief. Her past medical history is significant for bronchitis. There is no history of asthma or pneumonia.       Review of Systems   Constitutional: Negative for fever.   HENT: Positive for congestion, ear pain (right), postnasal drip, rhinorrhea and sinus pain. Negative for ear discharge and sore throat.    Respiratory: Positive for cough and sputum production. Negative for shortness of breath and wheezing.    Cardiovascular: Negative for chest pain.   Neurological: Positive for headaches.   All other systems reviewed and are negative.         Objective:     /76   Pulse 67   Temp 36.6 °C (97.8 °F) (Temporal)   Resp 16   Wt 122 kg (269 lb)   SpO2 97%   BMI 44.76 kg/m²      Physical Exam  Vitals signs and nursing note reviewed.   Constitutional:       General: She is not in acute distress.     Appearance: Normal appearance. She is well-developed and normal weight. She is not toxic-appearing.   HENT:      Head: Normocephalic and atraumatic.      Right Ear: A middle ear effusion is present. Tympanic membrane is injected, erythematous and bulging.      Left Ear: Tympanic membrane normal.      Nose: Congestion and rhinorrhea present.      Right Sinus: Maxillary sinus tenderness present.      Mouth/Throat:      Lips: Pink.      Mouth: Mucous membranes are moist.      Pharynx: Uvula midline. No oropharyngeal exudate.   Eyes:      Extraocular Movements: Extraocular movements intact.      Conjunctiva/sclera: Conjunctivae normal.      Pupils: Pupils are equal,  round, and reactive to light.   Neck:      Musculoskeletal: Normal range of motion and neck supple.   Cardiovascular:      Rate and Rhythm: Normal rate and regular rhythm.      Pulses: Normal pulses.      Heart sounds: Normal heart sounds.   Pulmonary:      Effort: Pulmonary effort is normal. No respiratory distress.      Breath sounds: Normal breath sounds. No rhonchi.   Abdominal:      Palpations: Abdomen is soft.   Musculoskeletal: Normal range of motion.   Skin:     General: Skin is warm and dry.      Capillary Refill: Capillary refill takes less than 2 seconds.   Neurological:      General: No focal deficit present.      Mental Status: She is alert and oriented to person, place, and time.      Gait: Gait normal.   Psychiatric:         Mood and Affect: Mood normal.         Behavior: Behavior is cooperative.            Assessment/Plan:     1. Acute suppurative otitis media of right ear without spontaneous rupture of tympanic membrane, recurrence not specified  amoxicillin-clavulanate (AUGMENTIN) 875-125 MG Tab    guaifenesin-codeine (TUSSI-ORGANIDIN NR) 100-10 MG/5ML syrup   2. Acute maxillary sinusitis, recurrence not specified  amoxicillin-clavulanate (AUGMENTIN) 875-125 MG Tab    guaifenesin-codeine (TUSSI-ORGANIDIN NR) 100-10 MG/5ML syrup   3. Cough  amoxicillin-clavulanate (AUGMENTIN) 875-125 MG Tab    guaifenesin-codeine (TUSSI-ORGANIDIN NR) 100-10 MG/5ML syrup     Motrin/Advil/Ibuprophen 600 mg every 6 hours as needed for pain or fever.    PT instructed not to drive or operate heavy machinery or drink alcohol while taking this medication because it contains either a narcotic or benzodiazepines which causes drowsiness. PT verbalized understanding of these instructions.     [facility] Aware web site evaluation: I have obtained and reviewed patient utilization report from [facility] pharmacy database prior to writing prescription for controlled substance.  No history of abuse.    PT should follow up with  PCP in 1-2 days for re-evaluation if symptoms have not improved.  Discussed red flags and reasons to return to UC or ED.  Pt and/or family verbalized understanding of diagnosis and follow up instructions and was offered informational handout on diagnosis.  PT discharged.

## 2020-05-22 ENCOUNTER — HOSPITAL ENCOUNTER (OUTPATIENT)
Dept: LAB | Facility: MEDICAL CENTER | Age: 40
End: 2020-05-22
Attending: FAMILY MEDICINE
Payer: COMMERCIAL

## 2020-05-22 LAB
ALBUMIN SERPL BCP-MCNC: 4.3 G/DL (ref 3.2–4.9)
ALBUMIN/GLOB SERPL: 1.2 G/DL
ALP SERPL-CCNC: 101 U/L (ref 30–99)
ALT SERPL-CCNC: 17 U/L (ref 2–50)
ANION GAP SERPL CALC-SCNC: 14 MMOL/L (ref 7–16)
AST SERPL-CCNC: 19 U/L (ref 12–45)
BASOPHILS # BLD AUTO: 1.1 % (ref 0–1.8)
BASOPHILS # BLD: 0.07 K/UL (ref 0–0.12)
BILIRUB SERPL-MCNC: 0.4 MG/DL (ref 0.1–1.5)
BUN SERPL-MCNC: 10 MG/DL (ref 8–22)
CALCIUM SERPL-MCNC: 9.7 MG/DL (ref 8.4–10.2)
CHLORIDE SERPL-SCNC: 100 MMOL/L (ref 96–112)
CHOLEST SERPL-MCNC: 192 MG/DL (ref 100–199)
CO2 SERPL-SCNC: 23 MMOL/L (ref 20–33)
CREAT SERPL-MCNC: 0.7 MG/DL (ref 0.5–1.4)
EOSINOPHIL # BLD AUTO: 0.09 K/UL (ref 0–0.51)
EOSINOPHIL NFR BLD: 1.4 % (ref 0–6.9)
ERYTHROCYTE [DISTWIDTH] IN BLOOD BY AUTOMATED COUNT: 43.4 FL (ref 35.9–50)
FASTING STATUS PATIENT QL REPORTED: NORMAL
GLOBULIN SER CALC-MCNC: 3.5 G/DL (ref 1.9–3.5)
GLUCOSE SERPL-MCNC: 94 MG/DL (ref 65–99)
HCT VFR BLD AUTO: 40.5 % (ref 37–47)
HDLC SERPL-MCNC: 61 MG/DL
HGB BLD-MCNC: 12.9 G/DL (ref 12–16)
IMM GRANULOCYTES # BLD AUTO: 0.02 K/UL (ref 0–0.11)
IMM GRANULOCYTES NFR BLD AUTO: 0.3 % (ref 0–0.9)
LDLC SERPL CALC-MCNC: 99 MG/DL
LYMPHOCYTES # BLD AUTO: 2.17 K/UL (ref 1–4.8)
LYMPHOCYTES NFR BLD: 33.5 % (ref 22–41)
MCH RBC QN AUTO: 26.5 PG (ref 27–33)
MCHC RBC AUTO-ENTMCNC: 31.9 G/DL (ref 33.6–35)
MCV RBC AUTO: 83.2 FL (ref 81.4–97.8)
MONOCYTES # BLD AUTO: 0.64 K/UL (ref 0–0.85)
MONOCYTES NFR BLD AUTO: 9.9 % (ref 0–13.4)
NEUTROPHILS # BLD AUTO: 3.49 K/UL (ref 2–7.15)
NEUTROPHILS NFR BLD: 53.8 % (ref 44–72)
NRBC # BLD AUTO: 0 K/UL
NRBC BLD-RTO: 0 /100 WBC
PLATELET # BLD AUTO: 383 K/UL (ref 164–446)
PMV BLD AUTO: 8.8 FL (ref 9–12.9)
POTASSIUM SERPL-SCNC: 3.9 MMOL/L (ref 3.6–5.5)
PROT SERPL-MCNC: 7.8 G/DL (ref 6–8.2)
RBC # BLD AUTO: 4.87 M/UL (ref 4.2–5.4)
SODIUM SERPL-SCNC: 137 MMOL/L (ref 135–145)
TRIGL SERPL-MCNC: 159 MG/DL (ref 0–149)
TSH SERPL DL<=0.005 MIU/L-ACNC: 1.87 UIU/ML (ref 0.38–5.33)
WBC # BLD AUTO: 6.5 K/UL (ref 4.8–10.8)

## 2020-05-22 PROCEDURE — 36415 COLL VENOUS BLD VENIPUNCTURE: CPT

## 2020-05-22 PROCEDURE — 80053 COMPREHEN METABOLIC PANEL: CPT

## 2020-05-22 PROCEDURE — 80061 LIPID PANEL: CPT

## 2020-05-22 PROCEDURE — 82306 VITAMIN D 25 HYDROXY: CPT

## 2020-05-22 PROCEDURE — 85025 COMPLETE CBC W/AUTO DIFF WBC: CPT

## 2020-05-22 PROCEDURE — 84443 ASSAY THYROID STIM HORMONE: CPT

## 2020-05-23 LAB — 25(OH)D3 SERPL-MCNC: 25 NG/ML (ref 30–100)

## 2020-09-09 ENCOUNTER — HOSPITAL ENCOUNTER (OUTPATIENT)
Dept: LAB | Facility: MEDICAL CENTER | Age: 40
End: 2020-09-09
Attending: OBSTETRICS & GYNECOLOGY
Payer: COMMERCIAL

## 2020-09-09 ENCOUNTER — HOSPITAL ENCOUNTER (OUTPATIENT)
Facility: MEDICAL CENTER | Age: 40
End: 2020-09-09
Attending: OBSTETRICS & GYNECOLOGY
Payer: COMMERCIAL

## 2020-09-09 PROCEDURE — 87591 N.GONORRHOEAE DNA AMP PROB: CPT

## 2020-09-09 PROCEDURE — 87491 CHLMYD TRACH DNA AMP PROBE: CPT

## 2020-09-09 PROCEDURE — 88175 CYTOPATH C/V AUTO FLUID REDO: CPT

## 2020-09-11 LAB
C TRACH DNA GENITAL QL NAA+PROBE: NEGATIVE
CYTOLOGY REG CYTOL: NORMAL
N GONORRHOEA DNA GENITAL QL NAA+PROBE: NEGATIVE
SPECIMEN SOURCE: NORMAL

## 2020-09-21 ENCOUNTER — HOSPITAL ENCOUNTER (OUTPATIENT)
Dept: LAB | Facility: MEDICAL CENTER | Age: 40
End: 2020-09-21
Attending: OBSTETRICS & GYNECOLOGY
Payer: COMMERCIAL

## 2020-09-21 LAB
ABO GROUP BLD: NORMAL
BASOPHILS # BLD AUTO: 1.2 % (ref 0–1.8)
BASOPHILS # BLD: 0.08 K/UL (ref 0–0.12)
BLD GP AB SCN SERPL QL: NORMAL
EOSINOPHIL # BLD AUTO: 0.1 K/UL (ref 0–0.51)
EOSINOPHIL NFR BLD: 1.6 % (ref 0–6.9)
ERYTHROCYTE [DISTWIDTH] IN BLOOD BY AUTOMATED COUNT: 45.4 FL (ref 35.9–50)
HBV SURFACE AG SER QL: NORMAL
HCT VFR BLD AUTO: 44.4 % (ref 37–47)
HCV AB SER QL: NORMAL
HGB BLD-MCNC: 14.1 G/DL (ref 12–16)
HIV 1+2 AB+HIV1 P24 AG SERPL QL IA: NORMAL
IMM GRANULOCYTES # BLD AUTO: 0.02 K/UL (ref 0–0.11)
IMM GRANULOCYTES NFR BLD AUTO: 0.3 % (ref 0–0.9)
LYMPHOCYTES # BLD AUTO: 1.69 K/UL (ref 1–4.8)
LYMPHOCYTES NFR BLD: 26.2 % (ref 22–41)
MCH RBC QN AUTO: 27.2 PG (ref 27–33)
MCHC RBC AUTO-ENTMCNC: 31.8 G/DL (ref 33.6–35)
MCV RBC AUTO: 85.7 FL (ref 81.4–97.8)
MONOCYTES # BLD AUTO: 0.48 K/UL (ref 0–0.85)
MONOCYTES NFR BLD AUTO: 7.4 % (ref 0–13.4)
NEUTROPHILS # BLD AUTO: 4.08 K/UL (ref 2–7.15)
NEUTROPHILS NFR BLD: 63.3 % (ref 44–72)
NRBC # BLD AUTO: 0 K/UL
NRBC BLD-RTO: 0 /100 WBC
PLATELET # BLD AUTO: 368 K/UL (ref 164–446)
PMV BLD AUTO: 9.4 FL (ref 9–12.9)
RBC # BLD AUTO: 5.18 M/UL (ref 4.2–5.4)
RH BLD: NORMAL
RUBV AB SER QL: 115 IU/ML
T4 FREE SERPL-MCNC: 1.32 NG/DL (ref 0.93–1.7)
TREPONEMA PALLIDUM IGG+IGM AB [PRESENCE] IN SERUM OR PLASMA BY IMMUNOASSAY: NORMAL
TSH SERPL DL<=0.005 MIU/L-ACNC: 1.23 UIU/ML (ref 0.38–5.33)
WBC # BLD AUTO: 6.5 K/UL (ref 4.8–10.8)

## 2020-09-21 PROCEDURE — 87340 HEPATITIS B SURFACE AG IA: CPT

## 2020-09-21 PROCEDURE — 84439 ASSAY OF FREE THYROXINE: CPT

## 2020-09-21 PROCEDURE — 87077 CULTURE AEROBIC IDENTIFY: CPT

## 2020-09-21 PROCEDURE — 86850 RBC ANTIBODY SCREEN: CPT

## 2020-09-21 PROCEDURE — 86900 BLOOD TYPING SEROLOGIC ABO: CPT

## 2020-09-21 PROCEDURE — 87389 HIV-1 AG W/HIV-1&-2 AB AG IA: CPT

## 2020-09-21 PROCEDURE — 86762 RUBELLA ANTIBODY: CPT

## 2020-09-21 PROCEDURE — 84443 ASSAY THYROID STIM HORMONE: CPT

## 2020-09-21 PROCEDURE — 86803 HEPATITIS C AB TEST: CPT

## 2020-09-21 PROCEDURE — 86901 BLOOD TYPING SEROLOGIC RH(D): CPT

## 2020-09-21 PROCEDURE — 87086 URINE CULTURE/COLONY COUNT: CPT

## 2020-09-21 PROCEDURE — 36415 COLL VENOUS BLD VENIPUNCTURE: CPT

## 2020-09-21 PROCEDURE — 86780 TREPONEMA PALLIDUM: CPT

## 2020-09-21 PROCEDURE — 85025 COMPLETE CBC W/AUTO DIFF WBC: CPT

## 2020-09-24 LAB
BACTERIA UR CULT: ABNORMAL
BACTERIA UR CULT: ABNORMAL
SIGNIFICANT IND 70042: ABNORMAL
SITE SITE: ABNORMAL
SOURCE SOURCE: ABNORMAL

## 2020-10-19 ENCOUNTER — HOSPITAL ENCOUNTER (OUTPATIENT)
Dept: LAB | Facility: MEDICAL CENTER | Age: 40
End: 2020-10-19
Attending: OBSTETRICS & GYNECOLOGY
Payer: COMMERCIAL

## 2020-10-19 PROCEDURE — 36415 COLL VENOUS BLD VENIPUNCTURE: CPT

## 2020-10-19 PROCEDURE — 82105 ALPHA-FETOPROTEIN SERUM: CPT

## 2020-10-22 LAB
# FETUSES US: NORMAL
AFP MOM SERPL: 0.76
AFP SERPL-MCNC: 15 NG/ML
AGE - REPORTED: 42.8 YR
CURRENT SMOKER: NO
FAMILY MEMBER DISEASES HX: NO
GA METHOD: NORMAL
GA: NORMAL WK
IDDM PATIENT QL: NO
INTEGRATED SCN PATIENT-IMP: NORMAL
SPECIMEN DRAWN SERPL: NORMAL

## 2020-12-17 ENCOUNTER — HOSPITAL ENCOUNTER (OUTPATIENT)
Dept: LAB | Facility: MEDICAL CENTER | Age: 40
End: 2020-12-17
Attending: FAMILY MEDICINE
Payer: COMMERCIAL

## 2020-12-17 PROCEDURE — C9803 HOPD COVID-19 SPEC COLLECT: HCPCS

## 2020-12-17 PROCEDURE — U0003 INFECTIOUS AGENT DETECTION BY NUCLEIC ACID (DNA OR RNA); SEVERE ACUTE RESPIRATORY SYNDROME CORONAVIRUS 2 (SARS-COV-2) (CORONAVIRUS DISEASE [COVID-19]), AMPLIFIED PROBE TECHNIQUE, MAKING USE OF HIGH THROUGHPUT TECHNOLOGIES AS DESCRIBED BY CMS-2020-01-R: HCPCS

## 2020-12-18 LAB
COVID ORDER STATUS COVID19: NORMAL
SARS-COV-2 RNA RESP QL NAA+PROBE: NOTDETECTED
SPECIMEN SOURCE: NORMAL

## 2021-01-07 ENCOUNTER — HOSPITAL ENCOUNTER (OUTPATIENT)
Dept: LAB | Facility: MEDICAL CENTER | Age: 41
End: 2021-01-07
Attending: OBSTETRICS & GYNECOLOGY
Payer: COMMERCIAL

## 2021-01-07 LAB
GLUCOSE 1H P 50 G GLC PO SERPL-MCNC: 130 MG/DL (ref 70–139)
HCT VFR BLD AUTO: 39.2 % (ref 37–47)
HGB BLD-MCNC: 12.6 G/DL (ref 12–16)
PLATELET # BLD AUTO: 305 K/UL (ref 164–446)
TREPONEMA PALLIDUM IGG+IGM AB [PRESENCE] IN SERUM OR PLASMA BY IMMUNOASSAY: NORMAL

## 2021-01-07 PROCEDURE — 86780 TREPONEMA PALLIDUM: CPT

## 2021-01-07 PROCEDURE — 82950 GLUCOSE TEST: CPT

## 2021-01-07 PROCEDURE — 85014 HEMATOCRIT: CPT

## 2021-01-07 PROCEDURE — 85018 HEMOGLOBIN: CPT

## 2021-01-07 PROCEDURE — 85049 AUTOMATED PLATELET COUNT: CPT

## 2021-01-07 PROCEDURE — 36415 COLL VENOUS BLD VENIPUNCTURE: CPT

## 2021-02-05 ENCOUNTER — HOSPITAL ENCOUNTER (OUTPATIENT)
Dept: LAB | Facility: MEDICAL CENTER | Age: 41
End: 2021-02-05
Attending: FAMILY MEDICINE
Payer: COMMERCIAL

## 2021-02-05 PROCEDURE — 82306 VITAMIN D 25 HYDROXY: CPT

## 2021-02-05 PROCEDURE — 36415 COLL VENOUS BLD VENIPUNCTURE: CPT

## 2021-02-06 LAB — 25(OH)D3 SERPL-MCNC: 49 NG/ML (ref 30–100)

## 2021-03-04 ENCOUNTER — HOSPITAL ENCOUNTER (OUTPATIENT)
Dept: LAB | Facility: MEDICAL CENTER | Age: 41
End: 2021-03-04
Attending: OBSTETRICS & GYNECOLOGY
Payer: COMMERCIAL

## 2021-03-04 PROCEDURE — 87081 CULTURE SCREEN ONLY: CPT

## 2021-03-04 PROCEDURE — 87150 DNA/RNA AMPLIFIED PROBE: CPT

## 2021-03-05 LAB — GP B STREP DNA SPEC QL NAA+PROBE: POSITIVE

## 2021-04-08 ENCOUNTER — HOSPITAL ENCOUNTER (INPATIENT)
Facility: MEDICAL CENTER | Age: 41
LOS: 2 days | End: 2021-04-10
Attending: OBSTETRICS & GYNECOLOGY | Admitting: OBSTETRICS & GYNECOLOGY
Payer: COMMERCIAL

## 2021-04-08 LAB
BASOPHILS # BLD AUTO: 0.8 % (ref 0–1.8)
BASOPHILS # BLD: 0.09 K/UL (ref 0–0.12)
EOSINOPHIL # BLD AUTO: 0.09 K/UL (ref 0–0.51)
EOSINOPHIL NFR BLD: 0.8 % (ref 0–6.9)
ERYTHROCYTE [DISTWIDTH] IN BLOOD BY AUTOMATED COUNT: 43.7 FL (ref 35.9–50)
ERYTHROCYTE [DISTWIDTH] IN BLOOD BY AUTOMATED COUNT: 44.4 FL (ref 35.9–50)
HCT VFR BLD AUTO: 33.6 % (ref 37–47)
HCT VFR BLD AUTO: 45.5 % (ref 37–47)
HGB BLD-MCNC: 11.4 G/DL (ref 12–16)
HGB BLD-MCNC: 15 G/DL (ref 12–16)
HOLDING TUBE BB 8507: NORMAL
IMM GRANULOCYTES # BLD AUTO: 0.05 K/UL (ref 0–0.11)
IMM GRANULOCYTES NFR BLD AUTO: 0.5 % (ref 0–0.9)
LYMPHOCYTES # BLD AUTO: 3.11 K/UL (ref 1–4.8)
LYMPHOCYTES NFR BLD: 28.7 % (ref 22–41)
MCH RBC QN AUTO: 28.6 PG (ref 27–33)
MCH RBC QN AUTO: 28.8 PG (ref 27–33)
MCHC RBC AUTO-ENTMCNC: 33 G/DL (ref 33.6–35)
MCHC RBC AUTO-ENTMCNC: 33.7 G/DL (ref 33.6–35)
MCV RBC AUTO: 85.5 FL (ref 81.4–97.8)
MCV RBC AUTO: 86.7 FL (ref 81.4–97.8)
MONOCYTES # BLD AUTO: 1.1 K/UL (ref 0–0.85)
MONOCYTES NFR BLD AUTO: 10.1 % (ref 0–13.4)
NEUTROPHILS # BLD AUTO: 6.41 K/UL (ref 2–7.15)
NEUTROPHILS NFR BLD: 59.1 % (ref 44–72)
NRBC # BLD AUTO: 0 K/UL
NRBC BLD-RTO: 0 /100 WBC
PLATELET # BLD AUTO: 281 K/UL (ref 164–446)
PLATELET # BLD AUTO: 382 K/UL (ref 164–446)
PMV BLD AUTO: 9.7 FL (ref 9–12.9)
PMV BLD AUTO: 9.9 FL (ref 9–12.9)
RBC # BLD AUTO: 3.92 M/UL (ref 4.2–5.4)
RBC # BLD AUTO: 5.25 M/UL (ref 4.2–5.4)
SARS-COV+SARS-COV-2 AG RESP QL IA.RAPID: NOTDETECTED
SARS-COV-2 RNA RESP QL NAA+PROBE: NOTDETECTED
SPECIMEN SOURCE: NORMAL
SPECIMEN SOURCE: NORMAL
WBC # BLD AUTO: 10.9 K/UL (ref 4.8–10.8)
WBC # BLD AUTO: 10.9 K/UL (ref 4.8–10.8)

## 2021-04-08 PROCEDURE — U0003 INFECTIOUS AGENT DETECTION BY NUCLEIC ACID (DNA OR RNA); SEVERE ACUTE RESPIRATORY SYNDROME CORONAVIRUS 2 (SARS-COV-2) (CORONAVIRUS DISEASE [COVID-19]), AMPLIFIED PROBE TECHNIQUE, MAKING USE OF HIGH THROUGHPUT TECHNOLOGIES AS DESCRIBED BY CMS-2020-01-R: HCPCS

## 2021-04-08 PROCEDURE — 304965 HCHG RECOVERY SERVICES

## 2021-04-08 PROCEDURE — 36415 COLL VENOUS BLD VENIPUNCTURE: CPT

## 2021-04-08 PROCEDURE — 59409 OBSTETRICAL CARE: CPT

## 2021-04-08 PROCEDURE — U0005 INFEC AGEN DETEC AMPLI PROBE: HCPCS

## 2021-04-08 PROCEDURE — A9270 NON-COVERED ITEM OR SERVICE: HCPCS | Performed by: OBSTETRICS & GYNECOLOGY

## 2021-04-08 PROCEDURE — 700111 HCHG RX REV CODE 636 W/ 250 OVERRIDE (IP): Performed by: OBSTETRICS & GYNECOLOGY

## 2021-04-08 PROCEDURE — 85027 COMPLETE CBC AUTOMATED: CPT

## 2021-04-08 PROCEDURE — 700102 HCHG RX REV CODE 250 W/ 637 OVERRIDE(OP): Performed by: OBSTETRICS & GYNECOLOGY

## 2021-04-08 PROCEDURE — 770002 HCHG ROOM/CARE - OB PRIVATE (112)

## 2021-04-08 PROCEDURE — 87426 SARSCOV CORONAVIRUS AG IA: CPT

## 2021-04-08 PROCEDURE — 85025 COMPLETE CBC W/AUTO DIFF WBC: CPT

## 2021-04-08 RX ORDER — CARBOPROST TROMETHAMINE 250 UG/ML
250 INJECTION, SOLUTION INTRAMUSCULAR
Status: DISCONTINUED | OUTPATIENT
Start: 2021-04-08 | End: 2021-04-08 | Stop reason: HOSPADM

## 2021-04-08 RX ORDER — MISOPROSTOL 200 UG/1
800 TABLET ORAL
Status: DISCONTINUED | OUTPATIENT
Start: 2021-04-08 | End: 2021-04-10 | Stop reason: HOSPADM

## 2021-04-08 RX ORDER — LIDOCAINE HYDROCHLORIDE 10 MG/ML
INJECTION, SOLUTION INFILTRATION; PERINEURAL
Status: ACTIVE
Start: 2021-04-08 | End: 2021-04-08

## 2021-04-08 RX ORDER — SODIUM CHLORIDE, SODIUM LACTATE, POTASSIUM CHLORIDE, CALCIUM CHLORIDE 600; 310; 30; 20 MG/100ML; MG/100ML; MG/100ML; MG/100ML
INJECTION, SOLUTION INTRAVENOUS PRN
Status: DISCONTINUED | OUTPATIENT
Start: 2021-04-08 | End: 2021-04-10 | Stop reason: HOSPADM

## 2021-04-08 RX ORDER — MISOPROSTOL 200 UG/1
800 TABLET ORAL
Status: DISCONTINUED | OUTPATIENT
Start: 2021-04-08 | End: 2021-04-08 | Stop reason: HOSPADM

## 2021-04-08 RX ORDER — METHYLERGONOVINE MALEATE 0.2 MG/ML
0.2 INJECTION INTRAVENOUS
Status: DISCONTINUED | OUTPATIENT
Start: 2021-04-08 | End: 2021-04-08 | Stop reason: HOSPADM

## 2021-04-08 RX ORDER — LEVOTHYROXINE SODIUM 0.2 MG/1
200 TABLET ORAL
Status: DISCONTINUED | OUTPATIENT
Start: 2021-04-09 | End: 2021-04-10 | Stop reason: HOSPADM

## 2021-04-08 RX ORDER — CARBOPROST TROMETHAMINE 250 UG/ML
250 INJECTION, SOLUTION INTRAMUSCULAR
Status: DISCONTINUED | OUTPATIENT
Start: 2021-04-08 | End: 2021-04-10 | Stop reason: HOSPADM

## 2021-04-08 RX ORDER — OXYTOCIN 10 [USP'U]/ML
INJECTION, SOLUTION INTRAMUSCULAR; INTRAVENOUS
Status: ACTIVE
Start: 2021-04-08 | End: 2021-04-08

## 2021-04-08 RX ORDER — METHYLERGONOVINE MALEATE 0.2 MG/ML
0.2 INJECTION INTRAVENOUS
Status: DISCONTINUED | OUTPATIENT
Start: 2021-04-08 | End: 2021-04-10 | Stop reason: HOSPADM

## 2021-04-08 RX ORDER — IBUPROFEN 600 MG/1
600 TABLET ORAL EVERY 6 HOURS PRN
Status: DISCONTINUED | OUTPATIENT
Start: 2021-04-08 | End: 2021-04-10 | Stop reason: HOSPADM

## 2021-04-08 RX ORDER — VITAMIN A ACETATE, BETA CAROTENE, ASCORBIC ACID, CHOLECALCIFEROL, .ALPHA.-TOCOPHEROL ACETATE, DL-, THIAMINE MONONITRATE, RIBOFLAVIN, NIACINAMIDE, PYRIDOXINE HYDROCHLORIDE, FOLIC ACID, CYANOCOBALAMIN, CALCIUM CARBONATE, FERROUS FUMARATE, ZINC OXIDE, CUPRIC OXIDE 3080; 12; 120; 400; 1; 1.84; 3; 20; 22; 920; 25; 200; 27; 10; 2 [IU]/1; UG/1; MG/1; [IU]/1; MG/1; MG/1; MG/1; MG/1; MG/1; [IU]/1; MG/1; MG/1; MG/1; MG/1; MG/1
1 TABLET, FILM COATED ORAL
Status: DISCONTINUED | OUTPATIENT
Start: 2021-04-08 | End: 2021-04-10 | Stop reason: HOSPADM

## 2021-04-08 RX ORDER — ERYTHROMYCIN 5 MG/G
OINTMENT OPHTHALMIC
Status: ACTIVE
Start: 2021-04-08 | End: 2021-04-08

## 2021-04-08 RX ORDER — HYDROCODONE BITARTRATE AND ACETAMINOPHEN 5; 325 MG/1; MG/1
1-2 TABLET ORAL EVERY 6 HOURS PRN
Status: DISCONTINUED | OUTPATIENT
Start: 2021-04-08 | End: 2021-04-10 | Stop reason: HOSPADM

## 2021-04-08 RX ORDER — ACETAMINOPHEN 325 MG/1
325 TABLET ORAL EVERY 4 HOURS PRN
Status: DISCONTINUED | OUTPATIENT
Start: 2021-04-08 | End: 2021-04-10 | Stop reason: HOSPADM

## 2021-04-08 RX ORDER — MISOPROSTOL 200 UG/1
TABLET ORAL
Status: ACTIVE
Start: 2021-04-08 | End: 2021-04-08

## 2021-04-08 RX ORDER — PHYTONADIONE 2 MG/ML
INJECTION, EMULSION INTRAMUSCULAR; INTRAVENOUS; SUBCUTANEOUS
Status: ACTIVE
Start: 2021-04-08 | End: 2021-04-08

## 2021-04-08 RX ORDER — HYDROCODONE BITARTRATE AND ACETAMINOPHEN 5; 325 MG/1; MG/1
1 TABLET ORAL EVERY 4 HOURS PRN
Status: DISCONTINUED | OUTPATIENT
Start: 2021-04-08 | End: 2021-04-08

## 2021-04-08 RX ORDER — HYDROCODONE BITARTRATE AND ACETAMINOPHEN 5; 325 MG/1; MG/1
2 TABLET ORAL EVERY 4 HOURS PRN
Status: DISCONTINUED | OUTPATIENT
Start: 2021-04-08 | End: 2021-04-08

## 2021-04-08 RX ORDER — IBUPROFEN 600 MG/1
600 TABLET ORAL EVERY 6 HOURS PRN
Status: DISCONTINUED | OUTPATIENT
Start: 2021-04-08 | End: 2021-04-08

## 2021-04-08 RX ADMIN — OXYTOCIN 2000 ML/HR: 10 INJECTION, SOLUTION INTRAMUSCULAR; INTRAVENOUS at 07:00

## 2021-04-08 RX ADMIN — PRENATAL WITH FERROUS FUM AND FOLIC ACID 1 TABLET: 3080; 920; 120; 400; 22; 1.84; 3; 20; 10; 1; 12; 200; 27; 25; 2 TABLET ORAL at 10:49

## 2021-04-08 RX ADMIN — OXYTOCIN 125 ML/HR: 10 INJECTION, SOLUTION INTRAMUSCULAR; INTRAVENOUS at 07:45

## 2021-04-08 ASSESSMENT — FIBROSIS 4 INDEX
FIB4 SCORE: 0.63
FIB4 SCORE: 0.63

## 2021-04-08 ASSESSMENT — LIFESTYLE VARIABLES
HAVE PEOPLE ANNOYED YOU BY CRITICIZING YOUR DRINKING: NO
EVER HAD A DRINK FIRST THING IN THE MORNING TO STEADY YOUR NERVES TO GET RID OF A HANGOVER: NO
EVER_SMOKED: YES
DOES PATIENT WANT TO STOP DRINKING: NO
TOTAL SCORE: 0
TOTAL SCORE: 0
ALCOHOL_USE: NO
TOTAL SCORE: 0
EVER FELT BAD OR GUILTY ABOUT YOUR DRINKING: NO
HAVE YOU EVER FELT YOU SHOULD CUT DOWN ON YOUR DRINKING: NO
CONSUMPTION TOTAL: INCOMPLETE

## 2021-04-08 ASSESSMENT — PATIENT HEALTH QUESTIONNAIRE - PHQ9
1. LITTLE INTEREST OR PLEASURE IN DOING THINGS: NOT AT ALL
SUM OF ALL RESPONSES TO PHQ9 QUESTIONS 1 AND 2: 0
2. FEELING DOWN, DEPRESSED, IRRITABLE, OR HOPELESS: NOT AT ALL

## 2021-04-08 ASSESSMENT — COPD QUESTIONNAIRES
HAVE YOU SMOKED AT LEAST 100 CIGARETTES IN YOUR ENTIRE LIFE: NO/DON'T KNOW
DO YOU EVER COUGH UP ANY MUCUS OR PHLEGM?: NO/ONLY WITH OCCASIONAL COLDS OR INFECTIONS
DURING THE PAST 4 WEEKS HOW MUCH DID YOU FEEL SHORT OF BREATH: NONE/LITTLE OF THE TIME
IN THE PAST 12 MONTHS DO YOU DO LESS THAN YOU USED TO BECAUSE OF YOUR BREATHING PROBLEMS: DISAGREE/UNSURE
COPD SCREENING SCORE: 0

## 2021-04-08 ASSESSMENT — PAIN DESCRIPTION - PAIN TYPE: TYPE: ACUTE PAIN

## 2021-04-08 NOTE — L&D DELIVERY NOTE
DATE OF SERVICE:  2021     DELIVERY NOTE: This is a 40-year-old  4, para 1 at 39 and 4/7 weeks who   presented to labor and delivery with complaints of contractions since 0230   hours.  When she was examined on labor and delivery, she was found to be   completely dilated.  She is known to be group B strep positive status, but   there was not time to start IV antibiotics.  The patient was admitted to labor   and delivery.  She subsequently underwent spontaneous rupture of membranes   for lightly meconium stained fluid.  She pushed twice to deliver the head over   an intact perineum.  Mouth and nose were bulb suctioned.  The baby delivered   through a nuchal cord.  The rest of the baby delivered easily with the next   push.  This was a viable female infant, weight 6 pounds 15 ounces, Apgars 7 and 9,   delivered at 0636 hours.  I was en route from [place] to attend the   delivery, so Dr. Meadows was able to perform the delivery for me.  I arrived and   was able to take over the delivery of the placenta.  The placenta delivered   spontaneously intact with a 3-vessel cord at 0644 hours.  Twenty units of IV   Pitocin, fundal massage and bimanual massage provided good uterine   contraction.  There was no evidence of retained products of conception.    Inspection of the cervix revealed no lacerations.  Inspection of the perineum   revealed no lacerations.  mL. Mother and infant were stable.        ______________________________  MD BRITTNY MAGANA/RICARDO    DD:  2021 07:18  DT:  2021 08:07    Job#:  [id number]

## 2021-04-08 NOTE — PROGRESS NOTES
Pt is a  EDC 21, 39.4 wks presenting to L&D via wheelchair actively bearing down. EFM and TOCO applied. SVE by RN. See flowsheets. RN delegated to other RN to call provider. Room set up for delivery. PIV inserted.   0636-  of viable female infant. APGARS 7/9. See delivery summary.   0700- Report given to JANNET Bella RN, POC discussed.

## 2021-04-08 NOTE — PROGRESS NOTES
0700 Received report from NOC RN, assumed care, POC discussed, all questions answered. Pt resting comfortably in bed with baby in arms, denies needs at this time, VSS, room finished being put back together after delivery, admission procedures finished.  0840 Pt ambulated to restroom to void, tolerated well, pam care provided. Pt transferred to PPU with baby in arms, both in stable condition. Bedside report given to PPU RN, assumed care, POC discussed, all questions answered. VSS. Cuddles and bands verified with both RN's at bedside.

## 2021-04-08 NOTE — H&P
DATE OF ADMISSION:  2021     ADMITTING DIAGNOSES:  1.  Intrauterine pregnancy at 39 and 4/7th weeks.  2.  Active labor.  3.  Group B strep positive.  4.  Advanced maternal age.     HISTORY:  This is a 40-year-old  4, para 1 with an estimated date of   confinement of 2021 making her 39 and 4/7th weeks who presents to labor   and delivery with complaints of contractions since 0230 hours.  She was found   to be completely dilated with a bulging bag of water and she was admitted to   labor and delivery.     Prenatal care has been with Dr. Huber.  Her estimated date of confinement   of 2021 was established by a 9-week ultrasound and last menstrual   period.     PRENATAL LABS:  Her blood type is O positive, antibody screen negative, RPR   nonreactive, rubella immune, hepatitis B surface antigen negative, hepatitis C   negative, HIV negative.  Her one-hour Glucola was normal.  Her group B strep   status is positive.  Her noninvasive prenatal screening and recessive gene   screening were low risk.     Complications with the pregnancy include advanced maternal age.  She has an   anterior placenta with no previa.     ALLERGIES:  She has no known drug allergies.     MEDICATIONS:  Include prenatal vitamins and levothyroxine 200 mcg 1 p.o. every   day.       PAST MEDICAL HISTORY:  Significant for hypothyroidism and asthma.     PAST SURGICAL HISTORY:  Significant for hysteroscopic D and C.  She has had   laparoscopic cholecystectomy.     FAMILY HISTORY:  Noncontributory.     SOCIAL HISTORY:  She denies tobacco, alcohol or IV drug use.     GYNECOLOGIC HISTORY:  She has no history of any HSV or abnormal Paps.     OBSTETRICAL HISTORY:  She has had one normal spontaneous vaginal delivery and   two elective abortions.     PHYSICAL EXAMINATION:  VITAL SIGNS:  Stable.  She is afebrile.  GENERAL:  She is in moderate distress with contractions.  ABDOMEN:  Gravid, nontender.  EXTREMITIES:  Show trace pedal  edema.  She had no cords or Homans sign.  PELVIC:  Fetal heart tones were in the 140s, category 1 tracing.  Tocometry   shows contractions every two minutes.  Cervical exam per the nurse, she is   completely dilated, bulging bag of water, vertex presentation.  Estimated   fetal weight is 3400 grams.     IMPRESSION:  This is a 40-year-old  4, para 1 at 39 and 4/7th weeks in   active labor.     PLAN:  1.  The patient will be admitted to labor and delivery.  2.  IV fluids and ampicillin will be started.  3.  There is currently reassuring fetal surveillance.  4.  Expect normal spontaneous vaginal delivery.        ______________________________  MD BRITTNY MAGANA/ALPA/[place]    DD:  2021 07:15  DT:  2021 07:41    Job#:  [id number]    CC:SNEHA GEIGER MD

## 2021-04-08 NOTE — PROGRESS NOTES
Pt received to room 300. Report received from L&D RN. Pt oriented to room, call light, infant security, emergency light, visiting hours and unit routine. Plan of care discussed encouraged to call with needs.

## 2021-04-09 PROCEDURE — 700102 HCHG RX REV CODE 250 W/ 637 OVERRIDE(OP): Performed by: OBSTETRICS & GYNECOLOGY

## 2021-04-09 PROCEDURE — 770002 HCHG ROOM/CARE - OB PRIVATE (112)

## 2021-04-09 PROCEDURE — A9270 NON-COVERED ITEM OR SERVICE: HCPCS | Performed by: OBSTETRICS & GYNECOLOGY

## 2021-04-09 RX ADMIN — LEVOTHYROXINE SODIUM 200 MCG: 0.2 TABLET ORAL at 06:18

## 2021-04-09 RX ADMIN — PRENATAL WITH FERROUS FUM AND FOLIC ACID 1 TABLET: 3080; 920; 120; 400; 22; 1.84; 3; 20; 10; 1; 12; 200; 27; 25; 2 TABLET ORAL at 09:45

## 2021-04-09 ASSESSMENT — EDINBURGH POSTNATAL DEPRESSION SCALE (EPDS)
I HAVE BEEN ABLE TO LAUGH AND SEE THE FUNNY SIDE OF THINGS: AS MUCH AS I ALWAYS COULD
I HAVE FELT SAD OR MISERABLE: NO, NOT AT ALL
I HAVE BEEN SO UNHAPPY THAT I HAVE HAD DIFFICULTY SLEEPING: NOT AT ALL
I HAVE BLAMED MYSELF UNNECESSARILY WHEN THINGS WENT WRONG: YES, SOME OF THE TIME
I HAVE FELT SCARED OR PANICKY FOR NO GOOD REASON: NO, NOT MUCH
I HAVE BEEN SO UNHAPPY THAT I HAVE BEEN CRYING: NO, NEVER
THE THOUGHT OF HARMING MYSELF HAS OCCURRED TO ME: NEVER
I HAVE LOOKED FORWARD WITH ENJOYMENT TO THINGS: AS MUCH AS I EVER DID
THINGS HAVE BEEN GETTING ON TOP OF ME: NO, I HAVE BEEN COPING AS WELL AS EVER
I HAVE BEEN ANXIOUS OR WORRIED FOR NO GOOD REASON: HARDLY EVER

## 2021-04-09 ASSESSMENT — PAIN DESCRIPTION - PAIN TYPE
TYPE: ACUTE PAIN

## 2021-04-09 NOTE — LACTATION NOTE
"This note was copied from a baby's chart.  Mother reports that she is breastfeeding her  without difficulty or discomfort. Her nipple appear intact and baby has had several stools.She does have a breast pump at home.     She reports that she breast fed her first child close to a year but he required supplements most of that time due to poor weight gain initially and her \"low milk supply\".    She does take medications for hypothyroidism. Encouraged lots of skin to skin time today. Discussed milk production in early days and how important breast stimulation can be in boosting production initially.    Encouraged to reach out for any concerns or if needing assistance.  "

## 2021-04-09 NOTE — CARE PLAN
Problem: Altered physiologic condition related to immediate post-delivery state and potential for bleeding/hemorrhage  Goal: Patient physiologically stable as evidenced by normal lochia, palpable uterine involution and vital signs within normal limits  Outcome: PROGRESSING AS EXPECTED  Note: Fundus firm, lochia light. Vitals WDL.      Problem: Alteration in comfort related to episiotomy, vaginal repair and/or after birth pains  Goal: Patient is able to ambulate, care for self and infant  Outcome: PROGRESSING AS EXPECTED  Note: Patient able to care for self and infant appropriately.

## 2021-04-09 NOTE — PROGRESS NOTES
Progress Note    Maame Enrique    day 1  Chief Admitting Dx: Indication for care in labor or delivery [O75.9]  Delivery Type: vaginal, spontaneous      Subjective:  Pt is nursing baby. Pt without complaints. Pt was GBBS positive and did not get antibiotics. Pt staying one more day.  Ambulating: yes  Tolerating oral feed: yes  Flatus: yes    Voiding: yes  Dizziness: no  Vitals:    04/08/21 1800 04/08/21 2200 04/09/21 0200 04/09/21 0530   BP: 125/72 120/75 107/67 112/74   Pulse: 87 83 91 82   Resp: 18 18 18 18   Temp: 36.7 °C (98 °F) 36.6 °C (97.9 °F) 36.2 °C (97.1 °F) 36.2 °C (97.2 °F)   TempSrc: Temporal Temporal Temporal Temporal   SpO2: 97% 92% 95% 95%   Weight:       Height:           Exam:  Gen: NAD  Abdomen: Abdomen soft, non-tender. BS normal. No masses,  No organomegaly  Fundus Tenderness: no  Below umbilicus: yes  Perineum: perineum intact  Extremities: 1+ edema  Lochia: mild    Labs:   Recent Results (from the past 24 hour(s))   SARS-COV Antigen JORDAN: Collect dry nasal swab AND NP swab in VTM    Collection Time: 04/08/21  7:55 AM   Result Value Ref Range    SARS-CoV-2 Source Nasal Swab     SARS-COV ANTIGEN JORDAN NotDetected Not-Detected   SARS-CoV-2 PCR (24 hour In-House): Collect NP swab in VTM    Collection Time: 04/08/21  8:00 AM    Specimen: Nasopharyngeal; Respirate   Result Value Ref Range    SARS-CoV-2 Source NP Swab     SARS-CoV-2 by PCR NotDetected    CBC without differential    Collection Time: 04/08/21  3:39 PM   Result Value Ref Range    WBC 10.9 (H) 4.8 - 10.8 K/uL    RBC 3.92 (L) 4.20 - 5.40 M/uL    Hemoglobin 11.4 (L) 12.0 - 16.0 g/dL    Hematocrit 33.6 (L) 37.0 - 47.0 %    MCV 85.5 81.4 - 97.8 fL    MCH 28.8 27.0 - 33.0 pg    MCHC 33.7 33.6 - 35.0 g/dL    RDW 43.7 35.9 - 50.0 fL    Platelet Count 281 164 - 446 K/uL    MPV 9.7 9.0 - 12.9 fL       Assessment/Plan:  Continue Routine postpartum care, pt stable. Pt afebrile.   Consider Discharge 24h-48 hours    Maira Jarvis,  M.D.

## 2021-04-10 VITALS
TEMPERATURE: 97.3 F | BODY MASS INDEX: 43.55 KG/M2 | OXYGEN SATURATION: 100 % | RESPIRATION RATE: 18 BRPM | HEART RATE: 77 BPM | DIASTOLIC BLOOD PRESSURE: 72 MMHG | HEIGHT: 66 IN | SYSTOLIC BLOOD PRESSURE: 115 MMHG | WEIGHT: 271 LBS

## 2021-04-10 PROCEDURE — 700102 HCHG RX REV CODE 250 W/ 637 OVERRIDE(OP): Performed by: OBSTETRICS & GYNECOLOGY

## 2021-04-10 PROCEDURE — A9270 NON-COVERED ITEM OR SERVICE: HCPCS | Performed by: OBSTETRICS & GYNECOLOGY

## 2021-04-10 RX ADMIN — LEVOTHYROXINE SODIUM 200 MCG: 0.2 TABLET ORAL at 06:52

## 2021-04-10 RX ADMIN — PRENATAL WITH FERROUS FUM AND FOLIC ACID 1 TABLET: 3080; 920; 120; 400; 22; 1.84; 3; 20; 10; 1; 12; 200; 27; 25; 2 TABLET ORAL at 09:41

## 2021-04-10 ASSESSMENT — PAIN DESCRIPTION - PAIN TYPE
TYPE: ACUTE PAIN

## 2021-04-10 NOTE — PROGRESS NOTES
0700- Bedside report received from TERESSA Chacon.  Patient denied needs.  0730- Patient assessment done.  Patient stated that she is voiding without difficulty and passing flatus.  Patient denied dizziness and stated that she is walking without difficulty.  Discussed pain management plan.  Patient will call when pain intervention needed.  Reviewed plan of care.  Patient verbalized understanding.  FOB at bedside.

## 2021-04-10 NOTE — PROGRESS NOTES
PP day #2    S:  Pt doing well.  Minimal lochia.  No problems voiding.  Breast feeding. Ready to go home. OTC Motrin for pain    O: T 98.5 P 72  /72  ABD: Soft, NT, ff below umb  EXT:+1 pedal edema, no cords or Homans  H/H   O+  GBS +    A/P:  PP day #2 S/P  at term, GBS +   Doing well  1.  D/C home  2.  F/U Cecile Molina 6 weeks  3.  Pelvic rest for 6 weeks  4.  OTC Motrin for pain

## 2021-04-10 NOTE — CARE PLAN
Problem: Potential knowledge deficit related to lack of understanding of self and  care  Goal: Patient will verbalize understanding of self and infant care  Outcome: PROGRESSING AS EXPECTED  Intervention: Assess patient and knowledge of self and infant care  Note: Patient providing care for self and infant. Second child. Parents have no questions or concerns at this time.      Problem: Potential anxiety related to difficulty adapting to parental role  Goal: Patient will verbalize and demonstrate effective bonding and parenting behavior  Outcome: PROGRESSING AS EXPECTED  Note: Patient does not exhibit or express signs of anxiety at this time. Patient seen holding infant and bonding appropriately.

## 2021-04-10 NOTE — PROGRESS NOTES
Patient educated on plan of care, including, safety, mobility, pain management, and medication administration. Patient requesting to call for pain medication when needed. Patient has no needs or concerns at this time. Call light within reach.

## 2021-04-10 NOTE — CARE PLAN
Problem: Communication  Goal: The ability to communicate needs accurately and effectively will improve  Outcome: PROGRESSING AS EXPECTED  Note: Reviewed plan of care with patient who verbalized understanding.

## 2021-04-10 NOTE — PROGRESS NOTES
6460- Bedside report received from TERESSA Horowitz.  Patient denied needs.  Patient stated desire for discharge home today.  Patient encouraged to read the written patient discharge education/instruction sheet.  8299- Patient assessment done.  Patient stated that she is voiding without difficulty and passing flatus.  Patient denied dizziness and stated that she is walking without difficulty.  Patient stated she read the written patient discharge education/instruction sheet and has no questions.  Reviewed plan of care.  FOB at bedside.  1205- Discharge instructions reviewed with patient who verbalized understanding and stated she has no questions.  No discharge Rx's.  1241- Patient stated that she is ready for discharge.  Patient declined offer for wheelchair escort.  Patient discharged to home, no change noted in condition, with infant and FOB.

## 2021-04-10 NOTE — DISCHARGE INSTRUCTIONS
PATIENT DISCHARGE EDUCATION INSTRUCTION SHEET      REASONS TO CALL YOUR OBSTETRICIAN  · Persistent fever, shaking, chills (Temperature higher than 100.4) may indicate you have an infection  · Heavy bleeding: soaking more than 1 pad per hour; Passing clots an egg-sized clot or bigger may mean you have an postpartum hemorrhage  · Foul odor from vagina or bad smelling or discolored discharge or blood  · Breast infection (Mastitis symptoms); breast pain, chills, fever, redness or red streaks, may feel flu like symptoms  · Urinary pain, burning or frequency  · Incision that is not healing, increased redness, swelling, tenderness or pain, or any pus from episiotomy or  site may mean you have an infection  · Redness, swelling, warmth, or painful to touch in the calf area of your leg may mean you have a blood clot  · Severe or intensified depression, thoughts or feelings of wanting to hurt yourself or someone else   · Pain in chest, obstructed breathing or shortness of breath (trouble catching your breath) may mean you are having a postpartum complication. Call your provider immediately   · Headache that does not get better, even after taking medicine, a bad headache with vision changes or pain in the upper right area of your belly may mean you have high blood pressure or post birth preeclampsia. Call your provider immediately    HAND WASHING  All family and friends should wash their hands:  · Before and after holding the baby  · Before feeding the baby  · After using the restroom or changing the baby's diaper    WOUND CARE  Ask your physician for additional care instructions. In general:  ·  Incision:  · May shower and pat incision dry   · Keep the incision clean and dry  · There should not be any opening or pus from the incision  · Continue to walk at home 3 times a day   · Do NOT lift anything heavier than your baby (over 10 pounds)  · Encourage family to help participate in care of the  to allow  rest and mom time to heal  · Episiotomy/Laceration  · May use pma-spray bottle, witch hazel pads and dermaplast spray for comfort  · Use pam-spray bottle after urinating to cleanse perineal area  · To prevent burning during urination spray pam-water bottle on labial area   · Pat perineal area dry until episiotomy/laceration is healed  · Continue to use pam-bottle until bleeding stops as needed  · If have a 2nd degree laceration or greater, a Sitz bath can offer relief from soreness, burning, and inflammation   · Sitz Bath   · Sit in 6 inches of warm water and soak laceration as needed until the laceration heals    VAGINAL CARE AND BLEEDING  · Nothing inside vagina for 6 weeks:   · No sexual intercourse, tampons or douching  · Bleeding may continue for 2-4 weeks. Amount and color may vary  · Soaking 1 pad or more in an hour for several hours is considered heavy bleeding  · Passing large egg sized blood clots can be concerning  · If you feel like you have heavy bleeding or are having increasing amount of blood clots call your Obstetrician immediately  · If you begin feeling faint upon standing, feeling sick to your stomach, have clammy skin, a really fast heartbeat, have chills, start feeling confused, dizzy, sleepy or weak, or feeling like you're going to faint call your Obstetrician immediately    HYPERTENSION   Preeclampsia or gestational hypertension are types of high blood pressure that only pregnant women can get. It is important for you to be aware of symptoms to seek early intervention and treatment. If you have any of these symptoms immediately call your Obstetrician    · Vision changes or blurred vision   · Severe headache or pain that is unrelieved with medication and will not go away  · Persistent pain in upper abdomen or shoulder   · Increased swelling of face, feet, or hands  · Difficulty breathing or shortness of breath at rest  · Urinating less than usual    URINATION AND BOWEL MOVEMENTS  · Eating  "more fiber (bran cereal, fruits, and vegetables) and drinking plenty of fluids will help to avoid constipation  · Urinary frequency and urgency after childbirth is normal  · If you experience any urinary pain, burning or frequency call your provider    BIRTH CONTROL  · It is possible to become pregnant at any time after delivery and while breastfeeding  · Plan to discuss a method of birth control with your physician at your post delivery follow up visit    POSTPARTUM BLUES  During the first few days after birth, you may experience a sense of the \"blues\" which may include impatience, irritability or even crying. These feelings come and go quickly. However, as many as 1 in 10 women experience emotional symptoms known as postpartum depression.     POSTPARTUM DEPRESSION  May start as early as the second or third day after delivery or take several weeks or months to develop. Symptoms of \"blues\" are present, but are more intense: Crying spells; loss of appetite; feelings of hopelessness or loss of control; fear of touching the baby; over concern or no concern at all about the baby; little or no concern about your own appearance/caring for yourself; and/or inability to sleep or excessive sleeping. Contact your Obstetrician if you are experiencing any of these symptoms     PREVENTING SHAKEN BABY  If you are angry or stressed, PUT THE BABY IN THE CRIB, step away, take some deep breaths, and wait until you are calm to care for the baby. DO NOT SHAKE THE BABY. You are not alone, call a supporter for help.  · [facility] 24/7 crisis call line (365-159-6380) or (1-637.119.7912)  · You can also text them, text \"ANSWER\" (172456)      "

## 2021-04-10 NOTE — DISCHARGE SUMMARY
DATE OF ADMISSION:  04/08/2021   DATE OF DISCHARGE:  04/10/2021     ADMISSION DIAGNOSES:    1.  Intrauterine pregnancy at 39 and 4/7th weeks.  2.  Active labor.  3.  Group B strep positive.  4.  Advanced maternal age.     Please see previously dictated history and physical.     HOSPITAL COURSE:  The patient was admitted to labor and delivery with the   above diagnoses.  She was completely dilated and underwent a precipitous   normal spontaneous vaginal delivery.  She had a viable female infant and   Apgars were 7 and 9.  She had an estimated blood loss of 200 mL.  She had no   lacerations and because of the rapidity of her delivery, there was no time for   IV antibiotics with her known group B strep positive status.  The patient's   postpartum course has been unremarkable and on postpartum day 2, she was doing   well, she had minimal lochia, she had no problems voiding, was breastfeeding,   was ready to be discharged home and was going to use over-the-counter   ibuprofen for pain.     PHYSICAL EXAMINATION ON DISCHARGE:  VITAL SIGNS:  Temperature is 98.5, pulse 72, blood pressure 109/72.  ABDOMEN:  Soft, nontender.  Fundus is firm, below the umbilicus.  EXTREMITIES:  Show +1 pedal edema.  She had no cords or Homans sign.     LABORATORY DATA:  Postpartum H and H were 11 and 33.  Blood type is O   positive.  Group B strep is positive.     DISCHARGE DIAGNOSES:    1.  Intrauterine pregnancy at term.  2.  Status post precipitous normal spontaneous vaginal delivery.  3.  Group B strep positive.  4.  Advanced maternal age.     DISCHARGE INSTRUCTIONS:  1.  The patient will be discharged home with instructions to follow up with   Dr. Huber in 6 weeks.  2.  She is instructed on pelvic rest for 6 weeks.  3.  She will take over-the-counter ibuprofen and prenatal vitamins.        ______________________________  MD BRITTNY MAGANA/GILBERTO    DD:  04/10/2021 06:27  DT:  04/10/2021 07:31    Job#:  [id number]    CC:SNEHA GONZALEZ  MD FELY

## 2021-05-04 ENCOUNTER — IMMUNIZATION (OUTPATIENT)
Dept: FAMILY PLANNING/WOMEN'S HEALTH CLINIC | Facility: IMMUNIZATION CENTER | Age: 41
End: 2021-05-04
Payer: COMMERCIAL

## 2021-05-04 DIAGNOSIS — Z23 ENCOUNTER FOR VACCINATION: Primary | ICD-10-CM

## 2021-05-04 PROCEDURE — 0001A PFIZER SARS-COV-2 VACCINE: CPT

## 2021-05-04 PROCEDURE — 91300 PFIZER SARS-COV-2 VACCINE: CPT

## 2021-05-27 ENCOUNTER — IMMUNIZATION (OUTPATIENT)
Dept: FAMILY PLANNING/WOMEN'S HEALTH CLINIC | Facility: IMMUNIZATION CENTER | Age: 41
End: 2021-05-27
Payer: COMMERCIAL

## 2021-05-27 DIAGNOSIS — Z23 ENCOUNTER FOR VACCINATION: Primary | ICD-10-CM

## 2021-05-27 PROCEDURE — 0002A PFIZER SARS-COV-2 VACCINE: CPT

## 2021-05-27 PROCEDURE — 91300 PFIZER SARS-COV-2 VACCINE: CPT

## 2021-08-21 ENCOUNTER — HOSPITAL ENCOUNTER (OUTPATIENT)
Dept: LAB | Facility: MEDICAL CENTER | Age: 41
End: 2021-08-21
Attending: FAMILY MEDICINE
Payer: COMMERCIAL

## 2021-08-21 LAB
25(OH)D3 SERPL-MCNC: 35 NG/ML (ref 30–100)
ALBUMIN SERPL BCP-MCNC: 4.3 G/DL (ref 3.2–4.9)
ALBUMIN/GLOB SERPL: 1.3 G/DL
ALP SERPL-CCNC: 120 U/L (ref 30–99)
ALT SERPL-CCNC: 21 U/L (ref 2–50)
ANION GAP SERPL CALC-SCNC: 13 MMOL/L (ref 7–16)
AST SERPL-CCNC: 21 U/L (ref 12–45)
BASOPHILS # BLD AUTO: 1.3 % (ref 0–1.8)
BASOPHILS # BLD: 0.06 K/UL (ref 0–0.12)
BILIRUB SERPL-MCNC: 0.3 MG/DL (ref 0.1–1.5)
BUN SERPL-MCNC: 11 MG/DL (ref 8–22)
CALCIUM SERPL-MCNC: 9.9 MG/DL (ref 8.5–10.5)
CHLORIDE SERPL-SCNC: 96 MMOL/L (ref 96–112)
CHOLEST SERPL-MCNC: 201 MG/DL (ref 100–199)
CO2 SERPL-SCNC: 23 MMOL/L (ref 20–33)
CREAT SERPL-MCNC: 0.63 MG/DL (ref 0.5–1.4)
EOSINOPHIL # BLD AUTO: 0.12 K/UL (ref 0–0.51)
EOSINOPHIL NFR BLD: 2.6 % (ref 0–6.9)
ERYTHROCYTE [DISTWIDTH] IN BLOOD BY AUTOMATED COUNT: 43.2 FL (ref 35.9–50)
FASTING STATUS PATIENT QL REPORTED: NORMAL
GLOBULIN SER CALC-MCNC: 3.4 G/DL (ref 1.9–3.5)
GLUCOSE SERPL-MCNC: 94 MG/DL (ref 65–99)
HCT VFR BLD AUTO: 42.2 % (ref 37–47)
HDLC SERPL-MCNC: 58 MG/DL
HGB BLD-MCNC: 13.8 G/DL (ref 12–16)
IMM GRANULOCYTES # BLD AUTO: 0.01 K/UL (ref 0–0.11)
IMM GRANULOCYTES NFR BLD AUTO: 0.2 % (ref 0–0.9)
LDLC SERPL CALC-MCNC: 111 MG/DL
LYMPHOCYTES # BLD AUTO: 1.79 K/UL (ref 1–4.8)
LYMPHOCYTES NFR BLD: 39 % (ref 22–41)
MCH RBC QN AUTO: 26.7 PG (ref 27–33)
MCHC RBC AUTO-ENTMCNC: 32.7 G/DL (ref 33.6–35)
MCV RBC AUTO: 81.6 FL (ref 81.4–97.8)
MONOCYTES # BLD AUTO: 0.46 K/UL (ref 0–0.85)
MONOCYTES NFR BLD AUTO: 10 % (ref 0–13.4)
NEUTROPHILS # BLD AUTO: 2.15 K/UL (ref 2–7.15)
NEUTROPHILS NFR BLD: 46.9 % (ref 44–72)
NRBC # BLD AUTO: 0 K/UL
NRBC BLD-RTO: 0 /100 WBC
PLATELET # BLD AUTO: 393 K/UL (ref 164–446)
PMV BLD AUTO: 9.5 FL (ref 9–12.9)
POTASSIUM SERPL-SCNC: 3.7 MMOL/L (ref 3.6–5.5)
PROT SERPL-MCNC: 7.7 G/DL (ref 6–8.2)
RBC # BLD AUTO: 5.17 M/UL (ref 4.2–5.4)
SODIUM SERPL-SCNC: 132 MMOL/L (ref 135–145)
TRIGL SERPL-MCNC: 161 MG/DL (ref 0–149)
TSH SERPL DL<=0.005 MIU/L-ACNC: 0.06 UIU/ML (ref 0.38–5.33)
WBC # BLD AUTO: 4.6 K/UL (ref 4.8–10.8)

## 2021-08-21 PROCEDURE — 80061 LIPID PANEL: CPT

## 2021-08-21 PROCEDURE — 85025 COMPLETE CBC W/AUTO DIFF WBC: CPT

## 2021-08-21 PROCEDURE — 80053 COMPREHEN METABOLIC PANEL: CPT

## 2021-08-21 PROCEDURE — 36415 COLL VENOUS BLD VENIPUNCTURE: CPT

## 2021-08-21 PROCEDURE — 82306 VITAMIN D 25 HYDROXY: CPT

## 2021-08-21 PROCEDURE — 84443 ASSAY THYROID STIM HORMONE: CPT

## 2021-10-27 ENCOUNTER — HOSPITAL ENCOUNTER (OUTPATIENT)
Dept: LAB | Facility: MEDICAL CENTER | Age: 41
End: 2021-10-27
Attending: FAMILY MEDICINE
Payer: COMMERCIAL

## 2021-10-27 LAB
ANION GAP SERPL CALC-SCNC: 13 MMOL/L (ref 7–16)
CHLORIDE SERPL-SCNC: 100 MMOL/L (ref 96–112)
CO2 SERPL-SCNC: 24 MMOL/L (ref 20–33)
POTASSIUM SERPL-SCNC: 4 MMOL/L (ref 3.6–5.5)
SODIUM SERPL-SCNC: 137 MMOL/L (ref 135–145)

## 2021-10-27 PROCEDURE — 36415 COLL VENOUS BLD VENIPUNCTURE: CPT

## 2021-10-27 PROCEDURE — 80051 ELECTROLYTE PANEL: CPT

## 2022-04-27 ENCOUNTER — HOSPITAL ENCOUNTER (OUTPATIENT)
Dept: RADIOLOGY | Facility: MEDICAL CENTER | Age: 42
End: 2022-04-27
Attending: OBSTETRICS & GYNECOLOGY
Payer: COMMERCIAL

## 2022-06-10 ENCOUNTER — APPOINTMENT (OUTPATIENT)
Dept: RADIOLOGY | Facility: MEDICAL CENTER | Age: 42
End: 2022-06-10
Attending: FAMILY MEDICINE
Payer: COMMERCIAL

## 2022-06-10 DIAGNOSIS — Z12.31 VISIT FOR SCREENING MAMMOGRAM: ICD-10-CM

## 2022-07-05 ENCOUNTER — HOSPITAL ENCOUNTER (OUTPATIENT)
Dept: LAB | Facility: MEDICAL CENTER | Age: 42
End: 2022-07-05
Attending: OBSTETRICS & GYNECOLOGY
Payer: COMMERCIAL

## 2022-07-05 PROCEDURE — 88175 CYTOPATH C/V AUTO FLUID REDO: CPT

## 2022-07-06 LAB — CYTOLOGY REG CYTOL: NORMAL

## 2022-07-26 ENCOUNTER — HOSPITAL ENCOUNTER (OUTPATIENT)
Dept: RADIOLOGY | Facility: MEDICAL CENTER | Age: 42
End: 2022-07-26
Attending: FAMILY MEDICINE
Payer: COMMERCIAL

## 2022-07-26 DIAGNOSIS — Z12.31 VISIT FOR SCREENING MAMMOGRAM: ICD-10-CM

## 2022-07-26 PROCEDURE — 77063 BREAST TOMOSYNTHESIS BI: CPT

## 2023-08-09 ENCOUNTER — HOSPITAL ENCOUNTER (OUTPATIENT)
Dept: LAB | Facility: MEDICAL CENTER | Age: 43
End: 2023-08-09
Attending: OBSTETRICS & GYNECOLOGY
Payer: COMMERCIAL

## 2023-08-09 PROCEDURE — 88175 CYTOPATH C/V AUTO FLUID REDO: CPT

## 2023-08-09 PROCEDURE — 87624 HPV HI-RISK TYP POOLED RSLT: CPT

## 2023-08-11 LAB
CYTOLOGIST CVX/VAG CYTO: NORMAL
CYTOLOGY CVX/VAG DOC CYTO: NORMAL
CYTOLOGY CVX/VAG DOC THIN PREP: NORMAL
HPV I/H RISK 4 DNA CVX QL PROBE+SIG AMP: NEGATIVE
NOTE NL11727A: NORMAL
OTHER STN SPEC: NORMAL
STAT OF ADQ CVX/VAG CYTO-IMP: NORMAL

## 2025-03-12 ENCOUNTER — OFFICE VISIT (OUTPATIENT)
Dept: URGENT CARE | Facility: CLINIC | Age: 45
End: 2025-03-12
Payer: COMMERCIAL

## 2025-03-12 VITALS
HEART RATE: 90 BPM | SYSTOLIC BLOOD PRESSURE: 124 MMHG | HEIGHT: 66 IN | RESPIRATION RATE: 18 BRPM | TEMPERATURE: 100.2 F | DIASTOLIC BLOOD PRESSURE: 74 MMHG | BODY MASS INDEX: 45.8 KG/M2 | OXYGEN SATURATION: 94 % | WEIGHT: 285 LBS

## 2025-03-12 DIAGNOSIS — R05.1 ACUTE COUGH: ICD-10-CM

## 2025-03-12 DIAGNOSIS — J10.1 INFLUENZA A: ICD-10-CM

## 2025-03-12 DIAGNOSIS — R68.89 FLU-LIKE SYMPTOMS: ICD-10-CM

## 2025-03-12 LAB
FLUAV RNA SPEC QL NAA+PROBE: POSITIVE
FLUBV RNA SPEC QL NAA+PROBE: NEGATIVE
RSV RNA SPEC QL NAA+PROBE: NEGATIVE
SARS-COV-2 RNA RESP QL NAA+PROBE: NEGATIVE

## 2025-03-12 PROCEDURE — 3074F SYST BP LT 130 MM HG: CPT

## 2025-03-12 PROCEDURE — 99213 OFFICE O/P EST LOW 20 MIN: CPT

## 2025-03-12 PROCEDURE — 0241U POCT CEPHEID COV-2, FLU A/B, RSV - PCR: CPT

## 2025-03-12 PROCEDURE — 3078F DIAST BP <80 MM HG: CPT

## 2025-03-12 RX ORDER — OSELTAMIVIR PHOSPHATE 75 MG/1
75 CAPSULE ORAL 2 TIMES DAILY
Qty: 10 CAPSULE | Refills: 0 | Status: SHIPPED | OUTPATIENT
Start: 2025-03-12 | End: 2025-03-17

## 2025-03-12 RX ORDER — ESCITALOPRAM OXALATE 20 MG/1
TABLET ORAL
COMMUNITY
Start: 2023-08-01

## 2025-03-12 RX ORDER — BENZONATATE 100 MG/1
100 CAPSULE ORAL 3 TIMES DAILY PRN
Qty: 15 CAPSULE | Refills: 0 | Status: SHIPPED | OUTPATIENT
Start: 2025-03-12

## 2025-03-12 ASSESSMENT — ENCOUNTER SYMPTOMS
SHORTNESS OF BREATH: 1
SINUS PAIN: 0
CHILLS: 1
SORE THROAT: 1
FEVER: 1
HEADACHES: 1
MYALGIAS: 1
COUGH: 1

## 2025-03-12 NOTE — PROGRESS NOTES
Subjective:   Chief Complaint  Maame Enrique is a 44 y.o. female who presents for Fever (Traveled, cough x 2 days, yesterday was achy, today had a fever 102-104, tired, headache )      History of Present Illness  Patient presents with complaints of bodyaches, chills, cough, headache, and congestion with green sputum that all started on Monday.  She reports a Tmax of 102-104 that is controlled with Tylenol.  She states on Friday she flew down to [place] and Sunday she flew back and then shortly after on Monday started developing the symptoms.  She reports taking NyQuil which mildly alleviated symptoms.    Fever   This is a new problem. The current episode started yesterday. The problem occurs constantly. The problem has been unchanged. The maximum temperature noted was 102 to 102.9 F. Associated symptoms include congestion, coughing, headaches, muscle aches and a sore throat. Pertinent negatives include no chest pain or ear pain. She has tried acetaminophen for the symptoms. The treatment provided mild relief.   Risk factors: recent travel        Review of Systems  Review of Systems   Constitutional:  Positive for chills and fever.   HENT:  Positive for congestion and sore throat. Negative for ear pain and sinus pain.    Respiratory:  Positive for cough and shortness of breath.    Cardiovascular:  Negative for chest pain.   Musculoskeletal:  Positive for myalgias.   Neurological:  Positive for headaches.       Past Medical History  Past Medical History:   Diagnosis Date    Thyroid disease        Family History  History reviewed. No pertinent family history.    Social History  Social History     Socioeconomic History    Marital status:    Tobacco Use    Smoking status: Former     Types: Cigarettes    Smokeless tobacco: Never   Vaping Use    Vaping status: Never Used   Substance and Sexual Activity    Alcohol use: Not Currently     Comment: social    Drug use: No    Sexual activity: Yes     Partners: Male  "      Surgical History  Past Surgical History:   Procedure Laterality Date    ERCP N/A 3/6/2017    Procedure: ERCP;  Surgeon: Basim Almanza M.D.;  Location: [facility];  Service:     ERCP W/REMOVAL CALCULUS N/A 3/6/2017    Procedure: ERCP W/REMOVAL CALCULUS;  Surgeon: Basim Almanza M.D.;  Location: [facility];  Service:     ERCP W/SPHINCTEROTOMY/PAPILL. N/A 3/6/2017    Procedure: ERCP W/SPHINCTEROTOMY/PAPILL.;  Surgeon: Basim Almanza M.D.;  Location: [facility];  Service:     HERI BY LAPAROSCOPY  3/6/2017    Procedure: HERI BY LAPAROSCOPY;  Surgeon: Brianna Norris M.D.;  Location: [facility];  Service:        Current Medications  Home Medications       Reviewed by TINO Russell (Nurse Practitioner) on 03/12/25 at 1655  Med List Status: <None>     Medication Last Dose Status   benzonatate (TESSALON) 100 MG Cap  Active   DM-Doxylamine-Acetaminophen (NYQUIL COLD & FLU PO) Taking Active   escitalopram (LEXAPRO) 20 MG tablet Taking Active   FLUZONE QUADRIVALENT 0.5 ML Suspension Prefilled Syringe injection  Active   LEVOTHYROXINE SODIUM Taking Active   Prenatal Vit-Fe Fumarate-FA (PRENATAL COMPLETE PO)  Active   vitamin D (CHOLECALCIFEROL) 1000 UNIT Tab Taking Active                    Allergies  No Known Allergies       Objective:     /74 (BP Location: Left arm, Patient Position: Sitting, BP Cuff Size: Adult)   Pulse 90   Temp 37.9 °C (100.2 °F) (Temporal)   Resp 18   Ht 1.676 m (5' 6\")   Wt (!) 129 kg (285 lb)   SpO2 94%     Physical Exam  Constitutional:       Appearance: Normal appearance.   HENT:      Right Ear: Tympanic membrane is scarred.      Left Ear: Tympanic membrane normal.      Mouth/Throat:      Mouth: Mucous membranes are moist.      Pharynx: Posterior oropharyngeal erythema present.   Cardiovascular:      Rate and Rhythm: Normal rate and regular rhythm.      Pulses: Normal pulses.      Heart sounds: Normal heart " sounds.   Pulmonary:      Effort: Pulmonary effort is normal.      Breath sounds: Normal breath sounds.   Skin:     General: Skin is warm.   Neurological:      Mental Status: She is alert.       Results for orders placed or performed in visit on 03/12/25   POCT CoV-2, Flu A/B, RSV by PCR    Collection Time: 03/12/25  4:39 PM   Result Value Ref Range    SARS-CoV-2 by PCR Negative Negative, Invalid    Influenza virus A RNA Positive (A) Negative, Invalid    Influenza virus B, PCR Negative Negative, Invalid    RSV, PCR Negative Negative, Invalid         Assessment/Plan:     Diagnosis  1. Flu-like symptoms  - POCT CoV-2, Flu A/B, RSV by PCR    2. Acute cough  - benzonatate (TESSALON) 100 MG Cap; Take 1 Capsule by mouth 3 times a day as needed for Cough.  Dispense: 15 Capsule; Refill: 0    3. Influenza A  - oseltamivir (TAMIFLU) 75 MG Cap; Take 1 Capsule by mouth 2 times a day for 5 days.  Dispense: 10 Capsule; Refill: 0    Other orders  - escitalopram (LEXAPRO) 20 MG tablet  - DM-Doxylamine-Acetaminophen (NYQUIL COLD & FLU PO); Take  by mouth.        MDM/Plan/Discussion  Get plenty of rest.  Drink lots of fluids (water, juice, or broth) to stay hydrated. This helps replace any fluids lost if you have a runny nose or sweating from a fever. Warm tea or soup can help soothe a sore throat.  Use a cool mist humidifier to add moisture to the air, if it helps.  Use saline nose drops or spray to relieve stuffiness.  Avoid smoking, and stay away from places where people are smoking.   You may consider intranasal steroids such as fluticasone (brand name Flonase), (other options include Nasonex, Rhinocort, Nasacort) daily; continue for at least 2-3 weeks. Any generic should work as well but may cause slightly more nasal irritation. Please take according to package directions.  This steroid will help reduce inflammation of your sinuses.  This is the number one medication to help with seasonal allergies and treat nasal inflammation.    Recommend over-the-counter cold and flu medications and Tylenol and/or ibuprofen for symptomatic relief and fevers.  Discussed good hand hygiene and ways to decrease spread of disease. Follow-up with PCP return for re-evaluation if symptoms persist/worsen. The patient demonstrated a good understanding and agreed with the treatment plan.  Given that her symptoms started on Monday morning I do not believe that she is a good candidate for antibiotic therapy.  We discussed the use of medications to help treat her symptoms.  She verbalizes understanding and agreement with this plan  Patient respiratory is shows that she is positive for influenza A patient was prescribed Tamiflu because she is with send the allotted timeframe to start this medication.  She was called and advised to start this medication immediately.        Differential diagnosis, natural history, supportive care, and indications for immediate follow-up discussed.    Advised the patient to follow-up with the primary care physician for recheck, reevaluation, and consideration of further management.    Advised the patient to return or seek immediate medical attention if symptoms worsen or new symptoms develop    Please note that this dictation was created using voice recognition software. I have made a reasonable attempt to correct obvious errors, but I expect that there are errors of grammar and possibly content that I did not discover before finalizing the note.    This note was electronically signed by TINO Russell

## 2025-04-27 ENCOUNTER — OFFICE VISIT (OUTPATIENT)
Dept: URGENT CARE | Facility: CLINIC | Age: 45
End: 2025-04-27
Payer: COMMERCIAL

## 2025-04-27 ENCOUNTER — APPOINTMENT (OUTPATIENT)
Dept: RADIOLOGY | Facility: IMAGING CENTER | Age: 45
End: 2025-04-27
Attending: STUDENT IN AN ORGANIZED HEALTH CARE EDUCATION/TRAINING PROGRAM
Payer: COMMERCIAL

## 2025-04-27 VITALS
HEART RATE: 125 BPM | BODY MASS INDEX: 45.8 KG/M2 | HEIGHT: 66 IN | DIASTOLIC BLOOD PRESSURE: 80 MMHG | WEIGHT: 285 LBS | SYSTOLIC BLOOD PRESSURE: 132 MMHG | RESPIRATION RATE: 16 BRPM | TEMPERATURE: 96.5 F | OXYGEN SATURATION: 100 %

## 2025-04-27 DIAGNOSIS — S99.912A INJURY OF LEFT ANKLE, INITIAL ENCOUNTER: ICD-10-CM

## 2025-04-27 PROCEDURE — 3079F DIAST BP 80-89 MM HG: CPT | Performed by: STUDENT IN AN ORGANIZED HEALTH CARE EDUCATION/TRAINING PROGRAM

## 2025-04-27 PROCEDURE — 3075F SYST BP GE 130 - 139MM HG: CPT | Performed by: STUDENT IN AN ORGANIZED HEALTH CARE EDUCATION/TRAINING PROGRAM

## 2025-04-27 PROCEDURE — 99213 OFFICE O/P EST LOW 20 MIN: CPT | Performed by: STUDENT IN AN ORGANIZED HEALTH CARE EDUCATION/TRAINING PROGRAM

## 2025-04-27 PROCEDURE — 73610 X-RAY EXAM OF ANKLE: CPT | Mod: TC,LT | Performed by: RADIOLOGY

## 2025-04-27 NOTE — PROGRESS NOTES
Subjective:   Maame Enrique is a 44 y.o. female who presents for Ankle Injury (Rolled ankle yesterday, left ankle, painful to walk on, swollen, bruised)      HPI:  44-year-old female presents after ankle injury yesterday.  She had an ankle inversion as she stepped into a small hole.  Significant pain discomfort significant swelling.  Bruising on the lateral aspect.  Is able to bear weight but significant pain feels like she cannot put her whole weight on her ankle as it is too painful.  Able to wiggle her toes move her feet.          Medications:    benzonatate Caps  escitalopram  LEVOTHYROXINE SODIUM  NYQUIL COLD & FLU PO  vitamin D Tabs    Allergies: Patient has no known allergies.    Problem List: Maame Enrique does not have any pertinent problems on file.    Surgical History:  Past Surgical History:   Procedure Laterality Date    ERCP N/A 3/6/2017    Procedure: ERCP;  Surgeon: Basim Almanza M.D.;  Location: [facility];  Service:     ERCP W/REMOVAL CALCULUS N/A 3/6/2017    Procedure: ERCP W/REMOVAL CALCULUS;  Surgeon: Basim Almanza M.D.;  Location: [facility];  Service:     ERCP W/SPHINCTEROTOMY/PAPILL. N/A 3/6/2017    Procedure: ERCP W/SPHINCTEROTOMY/PAPILL.;  Surgeon: Basim Almanza M.D.;  Location: [facility];  Service:     HERI BY LAPAROSCOPY  3/6/2017    Procedure: HERI BY LAPAROSCOPY;  Surgeon: Brianna Norris M.D.;  Location: [facility];  Service:        Past Social Hx: Maame Enrique  reports that she has quit smoking. Her smoking use included cigarettes. She has never used smokeless tobacco. She reports that she does not currently use alcohol. She reports that she does not use drugs.     Past Family Hx:  Maame Enrique family history is not on file.     Problem list, medications, and allergies reviewed by myself today in Epic.     Objective:     /80 (BP Location: Left arm, Patient Position: Sitting, BP Cuff Size:  "Adult)   Pulse (!) 125   Temp 35.8 °C (96.5 °F) (Temporal)   Resp 16   Ht 1.676 m (5' 6\")   Wt (!) 129 kg (285 lb)   SpO2 100%   BMI 46.00 kg/m²     Physical Exam  Constitutional:       Appearance: Normal appearance.   HENT:      Head: Normocephalic and atraumatic.      Right Ear: Tympanic membrane normal.      Left Ear: Tympanic membrane normal.   Cardiovascular:      Rate and Rhythm: Normal rate and regular rhythm.      Pulses: Normal pulses.      Heart sounds: Normal heart sounds.   Pulmonary:      Effort: Pulmonary effort is normal.      Breath sounds: Normal breath sounds.   Musculoskeletal:      Comments: Left ankle with significant swelling on the lateral aspect.  Ecchymosis present.  TTP on lateral malleolus as well as cuboid area.  Base of fifth metatarsal no tenderness.  Normal range of motion, pulses intact.   Neurological:      Mental Status: She is alert.         Assessment/Plan:     Diagnosis and associated orders:     1. Injury of left ankle, initial encounter  DX-ANKLE 3+ VIEWS LEFT         Comments/MDM:     1. Injury of left ankle, initial encounter  4-year-old female with acute left ankle pain.  Significant swelling on the lateral aspect down to the top of her foot.  X-ray negative  - Likely high sprain.  - Patient provided with high walking boot as well as crutches.  - Patient instructed on gentle return to weightbearing as well as as how to transition out of boot onto regular's bracing and then regular shoes.  Weightbearing as tolerated currently.  Can remove bracing as needed for showers and baths.  - Continue with OTC Tylenol ibuprofen.  - Continue with elevation and ice packs.  - If no significant improvement in the next 1 to 2 weeks or any stagnation in recovery I recommend evaluation by sports medicine.  - DX-ANKLE 3+ VIEWS LEFT; Future      RADIOLOGY RESULTS   DX-ANKLE 3+ VIEWS LEFT  Result Date: 4/27/2025 4/27/2025 2:25 PM HISTORY/REASON FOR EXAM:  Pain/Deformity Following Trauma. " Fall, LEFT ankle swelling and pain. TECHNIQUE/EXAM DESCRIPTION AND NUMBER OF VIEWS:  3 views of the LEFT ankle. COMPARISON: None. FINDINGS: Diffuse swelling about the ankle. Mortise is congruent. No fracture or dislocation.     1.  No fracture or dislocation of LEFT ankle. 2.  Diffuse soft tissue swelling.                  Differential diagnosis, natural history, supportive care, and indications for immediate follow-up discussed.    Advised the patient to follow-up with the primary care physician for recheck, reevaluation, and consideration of further management.    Please note that this dictation was created using voice recognition software. I have made a reasonable attempt to correct obvious errors, but I expect that there are errors of grammar and possibly content that I did not discover before finalizing the note.    Justin Szymanski M.D.

## 2025-08-14 ENCOUNTER — HOSPITAL ENCOUNTER (OUTPATIENT)
Facility: MEDICAL CENTER | Age: 45
End: 2025-08-14
Attending: OBSTETRICS & GYNECOLOGY
Payer: COMMERCIAL

## 2025-08-14 VITALS — WEIGHT: 291 LBS | HEIGHT: 66 IN | BODY MASS INDEX: 46.77 KG/M2

## 2025-08-14 DIAGNOSIS — Z12.31 VISIT FOR SCREENING MAMMOGRAM: ICD-10-CM

## 2025-08-14 PROCEDURE — 77067 SCR MAMMO BI INCL CAD: CPT

## 2025-08-18 ENCOUNTER — HOSPITAL ENCOUNTER (OUTPATIENT)
Facility: MEDICAL CENTER | Age: 45
End: 2025-08-18
Attending: OBSTETRICS & GYNECOLOGY
Payer: COMMERCIAL

## 2025-08-18 PROCEDURE — 88175 CYTOPATH C/V AUTO FLUID REDO: CPT

## 2025-08-20 ENCOUNTER — HOSPITAL ENCOUNTER (OUTPATIENT)
Dept: RADIOLOGY | Facility: MEDICAL CENTER | Age: 45
End: 2025-08-20

## 2025-08-20 ENCOUNTER — HOSPITAL ENCOUNTER (OUTPATIENT)
Dept: LAB | Facility: MEDICAL CENTER | Age: 45
End: 2025-08-20
Attending: STUDENT IN AN ORGANIZED HEALTH CARE EDUCATION/TRAINING PROGRAM
Payer: COMMERCIAL

## 2025-08-20 LAB
25(OH)D3 SERPL-MCNC: 40 NG/ML (ref 30–100)
ALBUMIN SERPL BCP-MCNC: 4.2 G/DL (ref 3.2–4.9)
ALBUMIN/GLOB SERPL: 1.2 G/DL
ALP SERPL-CCNC: 105 U/L (ref 30–99)
ALT SERPL-CCNC: 28 U/L (ref 2–50)
ANION GAP SERPL CALC-SCNC: 12 MMOL/L (ref 7–16)
APPEARANCE UR: ABNORMAL
AST SERPL-CCNC: 27 U/L (ref 12–45)
BACTERIA #/AREA URNS HPF: ABNORMAL /HPF
BASOPHILS # BLD AUTO: 1.3 % (ref 0–1.8)
BASOPHILS # BLD: 0.07 K/UL (ref 0–0.12)
BILIRUB SERPL-MCNC: 0.3 MG/DL (ref 0.1–1.5)
BILIRUB UR QL STRIP.AUTO: NEGATIVE
BUN SERPL-MCNC: 8 MG/DL (ref 8–22)
CALCIUM ALBUM COR SERPL-MCNC: 9.7 MG/DL (ref 8.5–10.5)
CALCIUM SERPL-MCNC: 9.9 MG/DL (ref 8.5–10.5)
CASTS URNS QL MICRO: ABNORMAL /LPF (ref 0–2)
CHLORIDE SERPL-SCNC: 102 MMOL/L (ref 96–112)
CHOLEST SERPL-MCNC: 221 MG/DL (ref 100–199)
CO2 SERPL-SCNC: 23 MMOL/L (ref 20–33)
COLOR UR: YELLOW
CREAT SERPL-MCNC: 0.75 MG/DL (ref 0.5–1.4)
EOSINOPHIL # BLD AUTO: 0.16 K/UL (ref 0–0.51)
EOSINOPHIL NFR BLD: 2.9 % (ref 0–6.9)
EPITHELIAL CELLS 1715: ABNORMAL /HPF (ref 0–5)
ERYTHROCYTE [DISTWIDTH] IN BLOOD BY AUTOMATED COUNT: 44.6 FL (ref 35.9–50)
EST. AVERAGE GLUCOSE BLD GHB EST-MCNC: 123 MG/DL
GFR SERPLBLD CREATININE-BSD FMLA CKD-EPI: 99 ML/MIN/1.73 M 2
GLOBULIN SER CALC-MCNC: 3.6 G/DL (ref 1.9–3.5)
GLUCOSE SERPL-MCNC: 88 MG/DL (ref 65–99)
GLUCOSE UR STRIP.AUTO-MCNC: NEGATIVE MG/DL
HBA1C MFR BLD: 5.9 % (ref 4–5.6)
HCT VFR BLD AUTO: 39.3 % (ref 37–47)
HDLC SERPL-MCNC: 50 MG/DL
HGB BLD-MCNC: 12.4 G/DL (ref 12–16)
IMM GRANULOCYTES # BLD AUTO: 0.01 K/UL (ref 0–0.11)
IMM GRANULOCYTES NFR BLD AUTO: 0.2 % (ref 0–0.9)
KETONES UR STRIP.AUTO-MCNC: 15 MG/DL
LDLC SERPL CALC-MCNC: 146 MG/DL
LEUKOCYTE ESTERASE UR QL STRIP.AUTO: NEGATIVE
LYMPHOCYTES # BLD AUTO: 2.11 K/UL (ref 1–4.8)
LYMPHOCYTES NFR BLD: 38.3 % (ref 22–41)
MCH RBC QN AUTO: 25.5 PG (ref 27–33)
MCHC RBC AUTO-ENTMCNC: 31.6 G/DL (ref 32.2–35.5)
MCV RBC AUTO: 80.9 FL (ref 81.4–97.8)
MICRO URNS: ABNORMAL
MONOCYTES # BLD AUTO: 0.67 K/UL (ref 0–0.85)
MONOCYTES NFR BLD AUTO: 12.2 % (ref 0–13.4)
NEUTROPHILS # BLD AUTO: 2.49 K/UL (ref 1.82–7.42)
NEUTROPHILS NFR BLD: 45.1 % (ref 44–72)
NITRITE UR QL STRIP.AUTO: NEGATIVE
NRBC # BLD AUTO: 0 K/UL
NRBC BLD-RTO: 0 /100 WBC (ref 0–0.2)
PH UR STRIP.AUTO: 5.5 [PH] (ref 5–8)
PLATELET # BLD AUTO: 442 K/UL (ref 164–446)
PMV BLD AUTO: 9.4 FL (ref 9–12.9)
POTASSIUM SERPL-SCNC: 4.2 MMOL/L (ref 3.6–5.5)
PROT SERPL-MCNC: 7.8 G/DL (ref 6–8.2)
PROT UR QL STRIP: NEGATIVE MG/DL
RBC # BLD AUTO: 4.86 M/UL (ref 4.2–5.4)
RBC # URNS HPF: ABNORMAL /HPF (ref 0–2)
RBC UR QL AUTO: NEGATIVE
SODIUM SERPL-SCNC: 137 MMOL/L (ref 135–145)
SP GR UR STRIP.AUTO: 1.03
TRIGL SERPL-MCNC: 124 MG/DL (ref 0–149)
TSH SERPL-ACNC: 1.99 UIU/ML (ref 0.38–5.33)
UROBILINOGEN UR STRIP.AUTO-MCNC: 0.2 EU/DL
WBC # BLD AUTO: 5.5 K/UL (ref 4.8–10.8)
WBC #/AREA URNS HPF: ABNORMAL /HPF

## 2025-08-20 PROCEDURE — 36415 COLL VENOUS BLD VENIPUNCTURE: CPT

## 2025-08-20 PROCEDURE — 80061 LIPID PANEL: CPT

## 2025-08-20 PROCEDURE — 82306 VITAMIN D 25 HYDROXY: CPT

## 2025-08-20 PROCEDURE — 84443 ASSAY THYROID STIM HORMONE: CPT

## 2025-08-20 PROCEDURE — 83036 HEMOGLOBIN GLYCOSYLATED A1C: CPT

## 2025-08-20 PROCEDURE — 85025 COMPLETE CBC W/AUTO DIFF WBC: CPT

## 2025-08-20 PROCEDURE — 81001 URINALYSIS AUTO W/SCOPE: CPT

## 2025-08-20 PROCEDURE — 80053 COMPREHEN METABOLIC PANEL: CPT

## 2025-08-21 LAB — THINPREP PAP, CYTOLOGY NL11781: NORMAL

## (undated) DEVICE — ELECTRODE 5MM LHK LAPSCP STERILE DISP- MEGADYNE  (5/CA)

## (undated) DEVICE — DRESSING TRANSPARENT FILM TEGADERM 2.375 X 2.75"  (100EA/BX)"

## (undated) DEVICE — TROCAR 5X100 BLADED Z-THREAD - KII (6/BX)

## (undated) DEVICE — TUBE SUCTION YANKAUER  1/4 X 6FT (20EA/CA)"

## (undated) DEVICE — ELECTRODE DUAL RETURN W/ CORD - (50/PK)

## (undated) DEVICE — TUBING CLEARLINK DUO-VENT - C-FLO (48EA/CA)

## (undated) DEVICE — BAG RETRIEVAL 10ML (10EA/BX)

## (undated) DEVICE — SPONGE GAUZE NON-STERILE 4X4 - (2000/CA 10PK/CA)

## (undated) DEVICE — BENZOIN TINCTURE AMPULE

## (undated) DEVICE — SUTURE 4-0 MONOCRYL PLUS PS-2 - 27 INCH (36/BX)

## (undated) DEVICE — PROTECTOR ULNA NERVE - (36PR/CA)

## (undated) DEVICE — CANNULA W/ SUPPLY TUBING O2 - (50/CA)

## (undated) DEVICE — BAG, SPONGE COUNT 50600

## (undated) DEVICE — CANISTER SUCTION RIGID RED 1500CC (40EA/CA)

## (undated) DEVICE — SUTURE 0 VICRYL PLUS UR-6 - 27 INCH (36/BX)

## (undated) DEVICE — WATER IRRIGATION STERILE 1000ML (12EA/CA)

## (undated) DEVICE — CANNULA W/SEAL 5X100 Z-THRE - ADED KII (12/BX)

## (undated) DEVICE — SYRINGE SAFETY 3 ML 18 GA X 1 1/2 BLUNT LL (100/BX 8BX/CA)

## (undated) DEVICE — SITE INJ 7/8IN IV M LL ADPR - (100/CA) THIS IS A CUSTOM ITEM AND HAS A 30 DAY LEAD TIME

## (undated) DEVICE — EXTRACTOR PRO XL 9-12 MM ABOVE

## (undated) DEVICE — KIT ANESTHESIA W/CIRCUIT & 3/LT BAG W/FILTER (20EA/CA)

## (undated) DEVICE — TUBE CONNECTING SUCTION - CLEAR PLASTIC STERILE 72 IN (50EA/CA)

## (undated) DEVICE — TROCAR Z THREAD 12 X 100 - BLADED (6/BX)

## (undated) DEVICE — HUMID-VENT HEAT AND MOISTURE EXCHANGE- (50/BX)

## (undated) DEVICE — GOWN SURGEONS LARGE - (32/CA)

## (undated) DEVICE — SYRINGE SAFETY 5 ML 18 GA X 1-1/2 BLUNT LL (100/BX 4BX/CA)

## (undated) DEVICE — MASK ANESTHESIA ADULT  - (100/CA)

## (undated) DEVICE — GLOVE BIOGEL SZ 6.5 SURGICAL PF LTX (50PR/BX 4BX/CA)

## (undated) DEVICE — SYRINGE SAFETY 10 ML 18 GA X 1 1/2 BLUNT LL (100/BX 4BX/CA)

## (undated) DEVICE — GLOVE, LITE (PAIR)

## (undated) DEVICE — Device

## (undated) DEVICE — SODIUM CHL IRRIGATION 0.9% 1000ML (12EA/CA)

## (undated) DEVICE — CLIP MED LG INTNL HRZN TI ESCP - (20/BX)

## (undated) DEVICE — RESTRAINTS LIMB DISP. - (12/BX 4BX/CA)

## (undated) DEVICE — NEEDLE INSUFFLATION FOR STEP - (12/BX)

## (undated) DEVICE — HEAD HOLDER JUNIOR/ADULT

## (undated) DEVICE — CHLORAPREP 26 ML APPLICATOR - ORANGE TINT(25/CA)

## (undated) DEVICE — ELECTRODE 850 FOAM ADHESIVE - HYDROGEL RADIOTRNSPRNT (50/PK)

## (undated) DEVICE — SENSOR SPO2 NEO LNCS ADHESIVE (20/BX) SEE USER NOTES

## (undated) DEVICE — SET EXTENSION WITH 2 PORTS (48EA/CA) ***PART #2C8610 IS A SUBSTITUTE*****

## (undated) DEVICE — SPONGE GAUZESTER. 2X2 4-PL - (2/PK 50PK/BX 30BX/CS)

## (undated) DEVICE — SUTURE GENERAL

## (undated) DEVICE — SUCTION INSTRUMENT YANKAUER BULBOUS TIP W/O VENT (50EA/CA)

## (undated) DEVICE — BITE BLOCK ADULT 60FR (100EA/CA)

## (undated) DEVICE — NEEDLE INSFL 120MM 14GA VRRS - (20/BX)

## (undated) DEVICE — GOWN WARMING STANDARD FLEX - (30/CA)

## (undated) DEVICE — KIT  I.V. START (100EA/CA)

## (undated) DEVICE — TUBING INSUFFLATION - (10/BX)

## (undated) DEVICE — BLOCK BITE ENDOSCOPIC 2809 - (100/BX) INTERMEDIATE

## (undated) DEVICE — BASIN EMESIS DISP. - (250/CA)

## (undated) DEVICE — TUBE E-T HI-LO CUFF 7.0MM (10EA/PK)

## (undated) DEVICE — KIT ROOM DECONTAMINATION

## (undated) DEVICE — LACTATED RINGERS INJ 1000 ML - (14EA/CA 60CA/PF)

## (undated) DEVICE — MASK WITH FACE SHIELD (25/BX 4BX/CA)

## (undated) DEVICE — SYRINGE DISP. 50CC LS - (40/BX)